# Patient Record
Sex: MALE | Race: WHITE | HISPANIC OR LATINO | Employment: FULL TIME | URBAN - METROPOLITAN AREA
[De-identification: names, ages, dates, MRNs, and addresses within clinical notes are randomized per-mention and may not be internally consistent; named-entity substitution may affect disease eponyms.]

---

## 2017-10-27 ENCOUNTER — NON-PROVIDER VISIT (OUTPATIENT)
Dept: OCCUPATIONAL MEDICINE | Facility: CLINIC | Age: 57
End: 2017-10-27

## 2017-10-27 DIAGNOSIS — Z23 IMMUNIZATION DUE: ICD-10-CM

## 2017-10-27 PROCEDURE — 90746 HEPB VACCINE 3 DOSE ADULT IM: CPT | Performed by: PREVENTIVE MEDICINE

## 2017-11-14 ENCOUNTER — OFFICE VISIT (OUTPATIENT)
Dept: MEDICAL GROUP | Facility: MEDICAL CENTER | Age: 57
End: 2017-11-14
Payer: COMMERCIAL

## 2017-11-14 VITALS
WEIGHT: 205 LBS | TEMPERATURE: 97.7 F | OXYGEN SATURATION: 96 % | DIASTOLIC BLOOD PRESSURE: 98 MMHG | BODY MASS INDEX: 32.18 KG/M2 | SYSTOLIC BLOOD PRESSURE: 150 MMHG | HEART RATE: 57 BPM | HEIGHT: 67 IN | RESPIRATION RATE: 16 BRPM

## 2017-11-14 DIAGNOSIS — F41.8 DEPRESSION WITH ANXIETY: ICD-10-CM

## 2017-11-14 DIAGNOSIS — R10.11 RUQ ABDOMINAL PAIN: ICD-10-CM

## 2017-11-14 DIAGNOSIS — G56.03 BILATERAL CARPAL TUNNEL SYNDROME: ICD-10-CM

## 2017-11-14 DIAGNOSIS — E66.9 OBESITY (BMI 30-39.9): ICD-10-CM

## 2017-11-14 DIAGNOSIS — L91.8 MULTIPLE ACQUIRED SKIN TAGS: ICD-10-CM

## 2017-11-14 DIAGNOSIS — Z23 NEED FOR INFLUENZA VACCINATION: ICD-10-CM

## 2017-11-14 PROCEDURE — 99204 OFFICE O/P NEW MOD 45 MIN: CPT | Performed by: FAMILY MEDICINE

## 2017-11-14 ASSESSMENT — PATIENT HEALTH QUESTIONNAIRE - PHQ9: CLINICAL INTERPRETATION OF PHQ2 SCORE: 0

## 2017-11-14 NOTE — LETTER
[facility]  [address]  [place] [address]  Fax: [phone]   Authorization for Release/Disclosure of   Protected Health Information   Name: MIKA MCMAHON : 1960 SSN: [id number]   Address: [address] [place] [address] [address]  [place] [address] Phone:    [phone] (home)    I authorize the entity listed below to release/disclose the PHI below to:   [facility]   Provider or Entity Name:     Address   City, State, Zip   Phone:      Fax:     Reason for request: continuity of care   Information to be released:    [  ] LAST COLONOSCOPY,  including any PATH REPORT and follow-up  [  ] LAST FIT/COLOGUARD RESULT [  ] LAST DEXA  [  ] LAST MAMMOGRAM  [  ] LAST PAP  [  ] LAST LABS [  ] RETINA EXAM REPORT  [  ] IMMUNIZATION RECORDS  [X  ] Release all info      [  ] Check here and initial the line next to each item to release ALL health information INCLUDING  _____ Care and treatment for drug and / or alcohol abuse  _____ HIV testing, infection status, or AIDS  _____ Genetic Testing    DATES OF SERVICE OR TIME PERIOD TO BE DISCLOSED: _____________  I understand and acknowledge that:  * This Authorization may be revoked at any time by you in writing, except if your health information has already been used or disclosed.  * Your health information that will be used or disclosed as a result of you signing this authorization could be re-disclosed by the recipient. If this occurs, your re-disclosed health information may no longer be protected by State or Federal laws.  * You may refuse to sign this Authorization. Your refusal will not affect your ability to obtain treatment.  * This Authorization becomes effective upon signing and will  on (date) __________.      If no date is indicated, this Authorization will  one (1) year from the signature date.    Name: Mika Mcmahon    Signature:   Date:     2017            PLEASE FAX REQUESTED RECORDS BACK TO: [phone]

## 2017-11-14 NOTE — ASSESSMENT & PLAN NOTE
Patient states that he used to be a heavier alcohol drinker, however stopped her count of approximately 24 years ago, was placed on antidepressant medication sometime after that, but stopped taking these medications approximately 8 years ago. Patient states that he continues to have mild anxiety and depression and a near constant basis. Patient doesn't have any particular triggers, states he has no great stressors between work, family life (lives with his son, has a good relationship with his ex-wife), and finances seem to be okay.    Of note, patient does work swing shift, typically from 4 PM to 12 AM Thursday to Monday, off on Tuesday and Wednesday.    Patient's last meal is typically at 8 PM, states that he will stay up for another few hours after coming home from work, typically watching TV or cell phone use.  Patient will then falsely sometime between 2 and 3 AM, will typically wake up by 7 or 8 AM. Therefore, patient estimates that he sleeps typically 5-6 times per night.    Patient states that room is fairly dark, is relatively comfortable, but that he shares room with his adult son who works the day shift.    Patient is not taking any illicit drugs, does not drink any alcohol, does not smoke cigarettes.    We discussed the possibility of therapy versus psychology referral in the future, but have decided upon using sleep hygiene as the first-line therapy at present time since this seems to be the most obvious inciting factor.    Of note, patient states that he can't feel centralized chest pain, attributes this to an anxiety attack, states that this more often happens when he has deficient amount of sleep, such as when he did only have 1-2 hours of sleep approximately 2 weeks ago.    Patient may have had a cardiac treadmill stress test by his former PCP. We will request records.    ROS is NEGATIVE for generalized weakness/fatigue, dizziness, vision/hearing changes, chest pain/pressure, palpitations, dyspnea,  tachypnea, intense feelings of dread, insomnia, decreased appetite, persistent nausea.

## 2017-11-14 NOTE — PROGRESS NOTES
Subjective:   Chief Complaint/History of Present Illness:  Mika Mcmahon is a 57 y.o. male patient new to [facility] who presents today to establish primary medical care and discuss medical conditions as listed below.  PMH, PSH, Social History, Medications, Allergies all reviewed as documented:    Depression with anxiety  Patient states that he used to be a heavier alcohol drinker, however stopped her count of approximately 24 years ago, was placed on antidepressant medication sometime after that, but stopped taking these medications approximately 8 years ago. Patient states that he continues to have mild anxiety and depression and a near constant basis. Patient doesn't have any particular triggers, states he has no great stressors between work, family life (lives with his son, has a good relationship with his ex-wife), and finances seem to be okay.    Of note, patient does work swing shift, typically from 4 PM to 12 AM Thursday to Monday, off on Tuesday and Wednesday.    Patient's last meal is typically at 8 PM, states that he will stay up for another few hours after coming home from work, typically watching TV or cell phone use.  Patient will then falsely sometime between 2 and 3 AM, will typically wake up by 7 or 8 AM. Therefore, patient estimates that he sleeps typically 5-6 times per night.    Patient states that room is fairly dark, is relatively comfortable, but that he shares room with his adult son who works the day shift.    Patient is not taking any illicit drugs, does not drink any alcohol, does not smoke cigarettes.    We discussed the possibility of therapy versus psychology referral in the future, but have decided upon using sleep hygiene as the first-line therapy at present time since this seems to be the most obvious inciting factor.    Of note, patient states that he can't feel centralized chest pain, attributes this to an anxiety attack, states that this more often happens when he has  deficient amount of sleep, such as when he did only have 1-2 hours of sleep approximately 2 weeks ago.    Patient may have had a cardiac treadmill stress test by his former PCP. We will request records.    ROS is NEGATIVE for generalized weakness/fatigue, dizziness, vision/hearing changes, chest pain/pressure, palpitations, dyspnea, tachypnea, intense feelings of dread, insomnia, decreased appetite, persistent nausea.    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 Patient currently works as a . Her [employer], and therefore has uses hands a lot. Patient does not recall being told to use carpal tunnel wrist splints nor being provided any stretches.    Patient states that this pain can awaken him at night with numbness in bilateral hands, does not have any bilateral  strength deficits secondary to numbness or paresthesias.    RUQ abdominal pain  Patient has right upper quadrant pain, states that this is typically provoked by eating fatty foods such as a cheeseburger. Patient and I reviewed his previous ultrasound in September 2016, which was suggestive of fatty liver disease. Given that patient has not drunk alcohol regularly in over 20 years, this is more likely be secondary to dietary patterns at present time.    Obesity (BMI 30-39.9)  Patient is mildly obese with a weight of 25 pounds at a height of 5 foot 7, with a BMI 32.1. Patient does not have regular cardiovascular exercise routines.    ROS is NEGATIVE for: cold or heat intolerance, anxiety/depression, chest pain/pressure, palpitations, hair thickening/coarsening/falling out/thinning, skin changes, diarrhea/constipation, unexpected weight change.    ROS is NEGATIVE for blurred vision, polydipsia, polyuria, diaphoresis, palpitations, fatigue,  irritability, flank pain, BLE paresthesias.    Multiple acquired skin tags  Patient with multiple skin tags, small one on the left neck superior to the clavicle, and a larger one in his left groin area that is larger.      Patient Active Problem List    Diagnosis Date Noted   • Bilateral carpal tunnel syndrome 11/14/2017   • Depression with anxiety 11/14/2017   • Obesity (BMI 30-39.9) 11/14/2017   • RUQ abdominal pain 11/14/2017   • Multiple acquired skin tags 11/14/2017       Additional History:   Allergies:    Review of patient's allergies indicates no known allergies.     Medications:     No current [facility]-ordered outpatient prescriptions on file.     No current [facility]-ordered facility-administered medications on file.         Past Medical History:     Past Medical History:   Diagnosis Date   • Psychiatric disorder     anxiety        Past Surgical History:   History reviewed. No pertinent surgical history.     Social History:     Social History   Substance Use Topics   • Smoking status: Former Smoker   • Smokeless tobacco: Never Used   • Alcohol use No        Family History:     No family status information on file.      History reviewed. No pertinent family history.    ROS:     - Constitutional: Negative for fever, chills, unexpected weight change, and fatigue/generalized weakness.     - Cardiovascular: Negative for chest pain, palpitations, orthopnea, and bilateral lower extremity edema.     - Gastrointestinal: Negative for heartburn, nausea, vomiting,  hematochezia, melena, diarrhea, constipation, and greasy/foul-smelling stools.     - Genitourinary: Negative for dysuria, polyuria, hematuria, pyuria, urinary urgency, and urinary incontinence.    - Musculoskeletal: Negative for myalgias, back pain, and joint pain.     - Skin: Negative for rash, itching, cyanotic skin color change.     - NOTE: All other systems reviewed and are negative, except as in HPI.     Objective:   Physical Exam:    Vitals: Blood pressure  "150/98, pulse (!) 57, temperature 36.5 °C (97.7 °F), resp. rate 16, height 1.702 m (5' 7\"), weight 93 kg (205 lb), SpO2 96 %.   BMI: Body mass index is 32.11 kg/m².   General/Constitutional: Vitals as above, Well nourished, well developed male in no acute distress   Head/Eyes:  - Head is grossly normal & atraumatic  - Bilateral conjunctivae clear and not injected, bilateral EOMI, bilateral PERRL   ENT:   - Bilateral external ears grossly normal in appearance, external auditory canals clear & bilateral TMs visualized with appropriate cone of light reflex, hearing grossly intact  - External nares normal in appearance and without discharge/bleeding, bilateral turbinates non-erythematous/non-edematous and without discharge/bleeding  - Good dentition & no palpable fluctuance of gums,  posterior oropharynx without erythema/edema/exudates  Neck: Neck supple, no masses, neck non-tender to palpation, no thyromegaly/goiter   Respiratory: No respiratory distress, bilateral lungs are clear to auscultation in all lung fields (anterior/lateral/posterior), no wheezing/rhonchi/rales   Cardiovascular: Regular rate and rhythm without murmur/gallops/rubs, distal pulses equal and 2+ bilaterally (radial, posterior tibial), no bilateral lower extremity edema   Gastrointestinal: Abdomen resonant to percussion, Bowel sounds present in all 4 quadrants, abdomen non-tender to light and deep palpation, ventral/umbilical hernias absent    MSK: Gait grossly normal & not antalgic, no tenderness to percussion of vertebral processes, no CVAT, no bilateral SI joint tenderness, no muscular atrophy of the thenar eminence or hand muscles of the first digit in bilateral hands   Integumentary: Small pedunculated hyper melanotic skin tag that is approximately 2 mm wide by 3-4 mm tall at the left neck in the area above his clavicle, 5-6 mm diameter by approximately one [facility] home pedunculated fleshy skin tag in left groin, otherwise no apparent " rashes   Neuro: Positive Tinel's and Phalen's sign in bilateral hands, Gross motor movement intact in all 4 extremities, Gross sensation intact to extremities and trunk, Gait grossly normal and not antalgic   Psych: Judgment grossly appropriate, no apparent depression/anxiety    Health Maintenance:      - I have requested previous records ([facility], [facility]), and will update accordingly.     Imaging/Labs:      - I have requested previous records ([facility], [facility]), and will update accordingly.     Assessment and Plan:   1. Depression with anxiety  Uncontrolled, May be secondary to alteration of sleep-wake cycles due to shift work. Patient to work on sleep hygiene, as documented in the patient instruction section below. We have discussed future possible plans including referral to psychology versus therapy.    2. Bilateral carpal tunnel syndrome  Uncontrolled, patient given conservative care instructions including stretching as source from sports medicine advisor, NSAID use (sees Profen 600 mg by mouth every 6 hours when necessary, as well as suggestion to use bilateral carpal tunnel wrist supports    3. RUQ abdominal pain  Uncontrolled, likely secondary to fatty liver disease as well as cholecystitis versus biliary sludge. Patient advised to eat a healthier diet, including more vegetables, whole grains, and lasts fat intake.    4. Obesity (BMI 30-39.9)  Uncontrolled.  Patient and I discussed the importance of lifestyle changes, with particular emphasis on plant-based nutrition (for the purposes of weight loss, general health, RUQ pain), as well as cardiovascular exercise, proper sleep, and stress management.  Patient verbalized understanding.   - Patient identified as having weight management issue.  Appropriate orders and counseling given.    5. Multiple acquired skin tags  Uncontrolled, we can use cryotherapy for the neck lesion, may be able to use it for the groin lesion as well.    6.  Need for influenza vaccination  Uncontrolled. However patient declines vaccination present time. We discussed good hand hygiene practices as well as general infection control practices that patient can perform to decrease risk of influenza infection.      RTC: In one month for depression/anxiety follow-up as well as potential cryotherapy for his skin tags, labs reviewed, and records review.    PLEASE NOTE: This dictation was created using voice recognition software. I have made every reasonable attempt to correct obvious errors, but I expect that there are errors of grammar and possibly content that I did not discover before finalizing the note.

## 2017-11-14 NOTE — ASSESSMENT & PLAN NOTE
Patient has right upper quadrant pain, states that this is typically provoked by eating fatty foods such as a cheeseburger. Patient and I reviewed his previous ultrasound in September 2016, which was suggestive of fatty liver disease. Given that patient has not drunk alcohol regularly in over 20 years, this is more likely be secondary to dietary patterns at present time.

## 2017-11-14 NOTE — ASSESSMENT & PLAN NOTE
Patient is mildly obese with a weight of 25 pounds at a height of 5 foot 7, with a BMI 32.1. Patient does not have regular cardiovascular exercise routines.    ROS is NEGATIVE for: cold or heat intolerance, anxiety/depression, chest pain/pressure, palpitations, hair thickening/coarsening/falling out/thinning, skin changes, diarrhea/constipation, unexpected weight change.    ROS is NEGATIVE for blurred vision, polydipsia, polyuria, diaphoresis, palpitations, fatigue, irritability, flank pain, BLE paresthesias.

## 2017-11-14 NOTE — PATIENT INSTRUCTIONS
"Síndrome del túnel carpiano   (Carpal Tunnel Syndrome)   El túnel carpiano es un espacio estrecho ubicado en el lado palmar de la jo. Está formado por los huesos de la jo y los ligamentos. Los nervios, vasos sanguíneos y tendones pasan a través del túnel carpiano. Los movimientos de la jo o ciertas enfermedades pueden causar hinchazón del túnel. Esta hinchazón comprime el nervio principal en la jo ((nervio mediano) y ocasiona un trastorno doloroso que se denomina síndrome del túnel carpiano.  CAUSAS   · Movimientos repetidos de la jo.  · Lesiones en la jo.  · Ciertas enfermedades hernesto la artritis, la diabetes, el alcoholismo, el hipertiroidismo o la insuficiencia renal.  · Obesidad.  · Embarazo.  SÍNTOMAS   · Sensación de \"pinchazos\" en los dedos o la mano.  · Hormigueo o entumecimiento en los dedos o en la mano.  · Sensación dolorosa en todo el brazo.  · Dolor en la jo que sube por el brazo hasta el hombro.  · Dolor que baja por la mano o los dedos.  · Sensación de debilidad en las tobi.  DIAGNÓSTICO   El médico le hará mayra historia clínica y un examen físico. Puede ser necesario hacer un electromiograma. Esta prueba mide las señales eléctricas enviadas por los músculos. Generalmente el paso de las señales eléctricas es impedido por el síndrome del túnel carpiano. También puede ser necesario que le tomen radiografías.   TRATAMIENTO   El síndrome del túnel carpiano puede curarse espontáneamente sin tratamiento. El médico le indicará el uso de un cabestrillo para la jo o que tome medicamentos hernesto antiinflamatorios no esteroides. Las inyecciones de cortisona pueden ayudar. A veces es necesaria la cirugía para liberar el nervio comprimido.   INSTRUCCIONES PARA EL CUIDADO EN EL HOGAR   · Volga todos los medicamentos según le indicó rogers médico. Sólo tome medicamentos de venta katelyn o recetados para calmar el dolor, las molestias o bajar la fiebre según las indicaciones de rogers médico.  · Si " le aconsejaron usar un cabestrillo para evitar que la jo se doble, úselo hernesto le indicaron. Es importante que use el cabestrillo patricia la noche. Úselo mientras sienta dolor o adormecimiento en la mano, el brazo o la jo. [place] puede durar entre 1 y 2 meses.  · Aditya reposar la jo de toda actividad que le cause dolor. Si wanda síntomas están relacionados con el trabajo, deberá conversar con rogers empleador acerca de la posibilidad de cambiar a mayra tarea que no requiera el uso de la jo.  · Aplique hielo en la jo después de los períodos prolongados de actividad.  ¨ Ponga el hielo en mayra bolsa plástica.  ¨ Colóquese mayra toalla entre la piel y la bolsa de hielo.  ¨ Deje el hielo en el lugar patricia 15 a 20 minutos, 3 a 4 veces por día.  · Cumpla con todas las visitas de control, según le indique rogers médico. Aquí se incluyen derivaciones a un ortopedista, fisioterapia y rehabilitación. Toda demora en obtener la asistencia necesaria puede heide hernesto resultado mayra demora o fracaso en la curación.  SOLICITE ATENCIÓN MÉDICA DE INMEDIATO SI:   · Desarrolla nuevos e inexplicables síntomas.  · Los síntomas actuales empeoran y la medicación no los controla.  ASEGÚRESE DE QUE:   · Comprende estas instrucciones.  · Controlará rogers enfermedad.  · Solicitará ayuda de inmediato si no mejora o si empeora.     Esta información no tiene hernesto fin reemplazar el consejo del médico. Asegúrese de hacerle al médico cualquier pregunta que tenga.     Document Released: 12/18/2006 Document Revised: 09/11/2013  Elsevier Interactive Patient Education ©2016 [employer]      Tips for Sleep:   A) Goal: Obtain a minimum of 7-8hours of continuous, uninterrupted, restful sleep per night.   B) Tips for Sleep Hygiene:   I) Go to bed and wake up at consistent times whether work/school day or not.    II) Keep room dark, quiet, and comfortable.  Increase exposure to sunlight during awake times and avoid bright lights (especially anything with a  backlight) at least the last 1-2hours before going to sleep.    III) Don't nap.    IV) Avoid stimulant or caffeine use more than 4 hours after wake time.

## 2017-11-14 NOTE — ASSESSMENT & PLAN NOTE
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 Patient currently works as a . Her [employer], and therefore has uses hands a lot. Patient does not recall being told to use carpal tunnel wrist splints nor being provided any stretches.    Patient states that this pain can awaken him at night with numbness in bilateral hands, does not have any bilateral  strength deficits secondary to numbness or paresthesias.

## 2017-11-14 NOTE — ASSESSMENT & PLAN NOTE
Patient with multiple skin tags, small one on the left neck superior to the clavicle, and a larger one in his left groin area that is larger.

## 2017-12-14 ENCOUNTER — OFFICE VISIT (OUTPATIENT)
Dept: MEDICAL GROUP | Facility: MEDICAL CENTER | Age: 57
End: 2017-12-14
Payer: COMMERCIAL

## 2017-12-14 ENCOUNTER — HOSPITAL ENCOUNTER (OUTPATIENT)
Dept: LAB | Facility: MEDICAL CENTER | Age: 57
End: 2017-12-14
Attending: FAMILY MEDICINE
Payer: COMMERCIAL

## 2017-12-14 VITALS
BODY MASS INDEX: 31.39 KG/M2 | HEIGHT: 67 IN | SYSTOLIC BLOOD PRESSURE: 124 MMHG | HEART RATE: 65 BPM | OXYGEN SATURATION: 96 % | TEMPERATURE: 98.9 F | DIASTOLIC BLOOD PRESSURE: 78 MMHG | WEIGHT: 200 LBS

## 2017-12-14 DIAGNOSIS — Z13.6 ENCOUNTER FOR LIPID SCREENING FOR CARDIOVASCULAR DISEASE: ICD-10-CM

## 2017-12-14 DIAGNOSIS — Z12.5 PROSTATE CANCER SCREENING: ICD-10-CM

## 2017-12-14 DIAGNOSIS — Z00.00 ROUTINE GENERAL MEDICAL EXAMINATION AT A HEALTH CARE FACILITY: ICD-10-CM

## 2017-12-14 DIAGNOSIS — F41.8 DEPRESSION WITH ANXIETY: ICD-10-CM

## 2017-12-14 DIAGNOSIS — G56.02 CARPAL TUNNEL SYNDROME OF LEFT WRIST: ICD-10-CM

## 2017-12-14 DIAGNOSIS — Z13.1 DIABETES MELLITUS SCREENING: ICD-10-CM

## 2017-12-14 DIAGNOSIS — Z13.220 ENCOUNTER FOR LIPID SCREENING FOR CARDIOVASCULAR DISEASE: ICD-10-CM

## 2017-12-14 DIAGNOSIS — K76.0 NON-ALCOHOLIC FATTY LIVER DISEASE: ICD-10-CM

## 2017-12-14 DIAGNOSIS — N52.01 ERECTILE DYSFUNCTION DUE TO ARTERIAL INSUFFICIENCY: ICD-10-CM

## 2017-12-14 DIAGNOSIS — L24.89 IRRITANT CONTACT DERMATITIS DUE TO OTHER AGENTS: ICD-10-CM

## 2017-12-14 LAB
ALBUMIN SERPL BCP-MCNC: 4.3 G/DL (ref 3.2–4.9)
ALBUMIN/GLOB SERPL: 1.2 G/DL
ALP SERPL-CCNC: 62 U/L (ref 30–99)
ALT SERPL-CCNC: 37 U/L (ref 2–50)
ANION GAP SERPL CALC-SCNC: 5 MMOL/L (ref 0–11.9)
AST SERPL-CCNC: 31 U/L (ref 12–45)
BILIRUB SERPL-MCNC: 1 MG/DL (ref 0.1–1.5)
BUN SERPL-MCNC: 12 MG/DL (ref 8–22)
CALCIUM SERPL-MCNC: 9.4 MG/DL (ref 8.5–10.5)
CHLORIDE SERPL-SCNC: 107 MMOL/L (ref 96–112)
CHOLEST SERPL-MCNC: 182 MG/DL (ref 100–199)
CO2 SERPL-SCNC: 27 MMOL/L (ref 20–33)
CREAT SERPL-MCNC: 0.85 MG/DL (ref 0.5–1.4)
EST. AVERAGE GLUCOSE BLD GHB EST-MCNC: 123 MG/DL
GFR SERPL CREATININE-BSD FRML MDRD: >60 ML/MIN/1.73 M 2
GLOBULIN SER CALC-MCNC: 3.7 G/DL (ref 1.9–3.5)
GLUCOSE SERPL-MCNC: 96 MG/DL (ref 65–99)
HBA1C MFR BLD: 5.9 % (ref 0–5.6)
HDLC SERPL-MCNC: 39 MG/DL
LDLC SERPL CALC-MCNC: 94 MG/DL
POTASSIUM SERPL-SCNC: 4.1 MMOL/L (ref 3.6–5.5)
PROT SERPL-MCNC: 8 G/DL (ref 6–8.2)
PSA SERPL-MCNC: 0.7 NG/ML (ref 0–4)
SODIUM SERPL-SCNC: 139 MMOL/L (ref 135–145)
TRIGL SERPL-MCNC: 243 MG/DL (ref 0–149)

## 2017-12-14 PROCEDURE — 99214 OFFICE O/P EST MOD 30 MIN: CPT | Performed by: FAMILY MEDICINE

## 2017-12-14 PROCEDURE — 80061 LIPID PANEL: CPT

## 2017-12-14 PROCEDURE — 80053 COMPREHEN METABOLIC PANEL: CPT

## 2017-12-14 PROCEDURE — 84153 ASSAY OF PSA TOTAL: CPT

## 2017-12-14 PROCEDURE — 83036 HEMOGLOBIN GLYCOSYLATED A1C: CPT

## 2017-12-14 PROCEDURE — 36415 COLL VENOUS BLD VENIPUNCTURE: CPT

## 2017-12-14 RX ORDER — METHYLPREDNISOLONE 4 MG/1
TABLET ORAL
Qty: 21 TAB | Refills: 0 | Status: SHIPPED | OUTPATIENT
Start: 2017-12-14 | End: 2017-12-20

## 2017-12-14 NOTE — ASSESSMENT & PLAN NOTE
Patient has hives in his left posterior axillary agent of skin extending superiorly to the superior of the lateral border of his scapula. Patient states that he was exposed to some form of irritant chemical approximately 1-2 months ago, thinks that this may have started since that time, has not gotten better using over-the-counter creams.    Patient says it is mildly pruritic, improved with cold showers, worsened with warm showers, also somewhat improved with scratching.

## 2017-12-14 NOTE — ASSESSMENT & PLAN NOTE
Patient was using his bilateral carpal tunnel wrist supports on a regular basis, however he has had such great improvement that she has stopped using them on a daily basis. Patient states that he has some mild residual numbness/paresthesias of the thenar eminence of the left hand only.    Patient continues to do stretches, and when necessary usage of NSAIDs.

## 2017-12-14 NOTE — ASSESSMENT & PLAN NOTE
"Patient states that he feels better, overall.  He continues to work swing shift, typically from 4 PM to 12 AM Thursday to Monday, off on Tuesday and Wednesday.     Patient's last meal is typically at 8 PM, has stopped his TV watching and cell phone use.  Patient will then fall sometime between around 1AM, will typically wake up by 8 or 9 AM.     Patient has changed his diet to have less spicy food, has cut out coffee, has cut down on simple carbs, and states that he has less abdominal bloating, all of which are helping him have better sleep.     Patient states that room is fairly dark, is relatively comfortable, but that he shares room with his adult son who works the day shift.     Patient is not taking any illicit drugs, does not drink any alcohol, does not smoke cigarettes.     Of note, patient did have one episode of sharp chest pain, but attributes this to exercising more now.      Patient has stopped having panic attacks.    Patient did take a vacation to visit family in [place], states that this helped him to relax more, but did \"eat too much.\"    Patient declines pharmacotherapy at this time, states that he will continue to work on sleep and stress routines.     ROS is NEGATIVE for generalized weakness/fatigue, dizziness, vision/hearing changes, chest pain/pressure, palpitations, dyspnea, tachypnea, intense feelings of dread, insomnia, decreased appetite, persistent nausea.  "

## 2017-12-14 NOTE — ASSESSMENT & PLAN NOTE
Patient and I reviewed his records, that the ultrasound September 2016 demonstrated fatty liver disease. Therefore, as patient is not an alcohol drinker, this is nonalcoholic fatty liver disease due to overweight/obesity/metabolic syndrome    ROS is NEGATIVE for generalized weakness/fatigue, generalized pruritis, jaundice, RUQ pain.      Patient denies binge or regular&heavy alcohol drinking behaviors, denies IV drug use, denies recent sexual intercourse with new partners.

## 2017-12-14 NOTE — PROGRESS NOTES
"Subjective:   Chief Complaint/History of Present Illness:  Mika Mcmahon is a 57 y.o. male established patient who presents today to discuss the following medical conditions:    Depression with anxiety  Patient states that he feels better, overall.  He continues to work swing shift, typically from 4 PM to 12 AM Thursday to Monday, off on Tuesday and Wednesday.     Patient's last meal is typically at 8 PM, has stopped his TV watching and cell phone use.  Patient will then fall sometime between around 1AM, will typically wake up by 8 or 9 AM.     Patient has changed his diet to have less spicy food, has cut out coffee, has cut down on simple carbs, and states that he has less abdominal bloating, all of which are helping him have better sleep.     Patient states that room is fairly dark, is relatively comfortable, but that he shares room with his adult son who works the day shift.     Patient is not taking any illicit drugs, does not drink any alcohol, does not smoke cigarettes.     Of note, patient did have one episode of sharp chest pain, but attributes this to exercising more now.      Patient has stopped having panic attacks.    Patient did take a vacation to visit family in [place], states that this helped him to relax more, but did \"eat too much.\"    Patient declines pharmacotherapy at this time, states that he will continue to work on sleep and stress routines.     ROS is NEGATIVE for generalized weakness/fatigue, dizziness, vision/hearing changes, chest pain/pressure, palpitations, dyspnea, tachypnea, intense feelings of dread, insomnia, decreased appetite, persistent nausea.    Erectile dysfunction due to arterial insufficiency  Patient reports problems with erectile dysfunction over the last four months.  Patient states that while he does have morning tumescence, erections are not as hard as they used to be.  Additionally, patient states he has difficulty maintaining erections.      Patient denies known " history of coronary artery disease or ischemic cardiomyopathy, patient denies dyspnea on exertion and also cramping in BLE with ambulation.    ROS is NEGATIVE for vision/hearing changes, jaw pain/paresthesias, BUE pain/paresthesias/numbness/weakness, chest pain/pressure, palpitations, dyspnea, RUQ abdominal pain, oliguria/anuria, BLE edema.    Patient denies improper use of prescription medication, denies use of non-prescription and/or illicit drugs, denies use of supplements.    Irritant contact dermatitis due to other agents  Patient has hives in his left posterior axillary agent of skin extending superiorly to the superior of the lateral border of his scapula. Patient states that he was exposed to some form of irritant chemical approximately 1-2 months ago, thinks that this may have started since that time, has not gotten better using over-the-counter creams.    Patient says it is mildly pruritic, improved with cold showers, worsened with warm showers, also somewhat improved with scratching.    Carpal tunnel syndrome of left wrist  Patient was using his bilateral carpal tunnel wrist supports on a regular basis, however he has had such great improvement that she has stopped using them on a daily basis. Patient states that he has some mild residual numbness/paresthesias of the thenar eminence of the left hand only.    Patient continues to do stretches, and when necessary usage of NSAIDs.    Non-alcoholic fatty liver disease  Patient and I reviewed his records, that the ultrasound September 2016 demonstrated fatty liver disease. Therefore, as patient is not an alcohol drinker, this is nonalcoholic fatty liver disease due to overweight/obesity/metabolic syndrome    ROS is NEGATIVE for generalized weakness/fatigue, generalized pruritis, jaundice, RUQ pain.      Patient denies binge or regular&heavy alcohol drinking behaviors, denies IV drug use, denies recent sexual intercourse with new partners.      Patient Active  "Problem List    Diagnosis Date Noted   • Irritant contact dermatitis due to other agents 12/14/2017   • Erectile dysfunction due to arterial insufficiency 12/14/2017   • Carpal tunnel syndrome of left wrist 11/14/2017   • Depression with anxiety 11/14/2017   • Obesity (BMI 30-39.9) 11/14/2017   • Non-alcoholic fatty liver disease 11/14/2017   • Multiple acquired skin tags 11/14/2017       Additional History:   Allergies:    Patient has no known allergies.     Current Medications:     Current Outpatient Prescriptions   Medication Sig Dispense Refill   • MethylPREDNISolone (MEDROL DOSEPAK) 4 MG Tablet Therapy Pack As directed on the packaging label. 21 Tab 0     No current facility-administered medications for this visit.         Social History:     Social History   Substance Use Topics   • Smoking status: Former Smoker   • Smokeless tobacco: Never Used   • Alcohol use No       ROS:     - NOTE: All other systems reviewed and are negative, except as in HPI.     Objective:   Physical Exam:    Vitals: Blood pressure 124/78, pulse 65, temperature 37.2 °C (98.9 °F), height 1.702 m (5' 7\"), weight 90.7 kg (200 lb), SpO2 96 %.   BMI: Body mass index is 31.32 kg/m².   General/Constitutional: Vitals as above, Well nourished, well developed male in no acute distress   Head/Eyes: Head is grossly normal & atraumatic, bilateral conjunctivae clear and not injected, bilateral EOMI, bilateral PERRL   ENT: Bilateral external ears grossly normal in appearance, Hearing grossly intact, External nares normal in appearance and without discharge/bleeding   Respiratory: No respiratory distress, bilateral lungs are clear to ausculation in all lung fields (anterior/lateral/posterior), no wheezing/rhonchi/rales   Cardiovascular: Regular rate and rhythm without murmur/gallops/rubs, distal pulses are intact and equal bilaterally (radial, posterior tibial), no bilateral lower extremity edema   MSK: Gait grossly normal & not " antalgic   Integumentary: Diffuse large patches of erythematous wheals in left axillary/left shoulder region, otherwise No apparent rashes   Psych: Judgment grossly appropriate, no apparent depression/anxiety    Health Maintenance:     - Not addressed at this visit    Imaging/Labs:     - Not addressed at this visit    Assessment and Plan:   1. Depression with anxiety  Chronic, uncontrolled, improving.  Patient declines pharmacotherapy at present time, would like to proceed with further lifestyle changes, particularly to dietary habits, sleep hygiene, and cardio vascular exercise.    2. Erectile dysfunction due to arterial insufficiency  3. Prostate cancer screening  Acute, uncontrolled, evaluate for possible BPH versus prostate cancer with PSA.   - PROSTATE SPECIFIC AG SCREENING; Future    4. Irritant contact dermatitis due to other agents  Uncontrolled, allergic contact dermatitis 2/2    - MethylPREDNISolone (MEDROL DOSEPAK) 4 MG Tablet Therapy Pack; As directed on the packaging label.  Dispense: 21 Tab; Refill: 0    5. Carpal tunnel syndrome of left wrist  Chronic, uncontrolled, however improving. Patient to continue to use carpal tunnel wrist stabilizers as well as NSAIDs and stretching.    6. Non-alcoholic fatty liver disease  Chronic, uncontrolled. Patient's work on muscle changes to reduce weight, and this should decrease over time. We can investigate preliminarily with CMP as below.    7. Routine general medical examination at a health care facility  8. Diabetes mellitus screening  9. Encounter for lipid screening for cardiovascular disease  Unknown control of metabolic health. Labs as below to evaluate.   - HEMOGLOBIN A1C; Future   - COMP METABOLIC PANEL; Future   - LIPID PROFILE; Future    RTC: in 1month for labs review, erectile dysfunction management.    PLEASE NOTE: This dictation was created using voice recognition software. I have made every reasonable attempt to correct obvious errors, but I expect  that there are errors of grammar and possibly content that I did not discover before finalizing the note.

## 2017-12-14 NOTE — ASSESSMENT & PLAN NOTE
Patient reports problems with erectile dysfunction over the last four months.  Patient states that while he does have morning tumescence, erections are not as hard as they used to be.  Additionally, patient states he has difficulty maintaining erections.      Patient denies known history of coronary artery disease or ischemic cardiomyopathy, patient denies dyspnea on exertion and also cramping in BLE with ambulation.    ROS is NEGATIVE for vision/hearing changes, jaw pain/paresthesias, BUE pain/paresthesias/numbness/weakness, chest pain/pressure, palpitations, dyspnea, RUQ abdominal pain, oliguria/anuria, BLE edema.    Patient denies improper use of prescription medication, denies use of non-prescription and/or illicit drugs, denies use of supplements.

## 2017-12-15 PROBLEM — E78.2 MIXED DYSLIPIDEMIA: Status: ACTIVE | Noted: 2017-12-15

## 2017-12-18 ENCOUNTER — TELEPHONE (OUTPATIENT)
Dept: MEDICAL GROUP | Facility: MEDICAL CENTER | Age: 57
End: 2017-12-18

## 2017-12-18 NOTE — TELEPHONE ENCOUNTER
Phone Number Called: [phone] (home)     Message: Called and notified patient of results. Patient to follow-up in January with provider.    Left Message for patient to call back: no

## 2017-12-18 NOTE — TELEPHONE ENCOUNTER
----- Message from [place] Mcclelland M.D. sent at 12/15/2017  1:02 PM PST -----  MA to call patient to relate the following:     - Prediabetes, cholesterol levels are off (high triglycerides, low HDL [good cholesterol])     - Normal liver and kidney function     - Prostate levels normal       - If patient would like to discuss them in further detail, we can discuss this at clinic visit on 01/17/18@8:20am.

## 2017-12-23 ENCOUNTER — APPOINTMENT (OUTPATIENT)
Dept: RADIOLOGY | Facility: MEDICAL CENTER | Age: 57
End: 2017-12-23
Attending: EMERGENCY MEDICINE
Payer: COMMERCIAL

## 2017-12-23 ENCOUNTER — HOSPITAL ENCOUNTER (EMERGENCY)
Facility: MEDICAL CENTER | Age: 57
End: 2017-12-23
Attending: EMERGENCY MEDICINE
Payer: COMMERCIAL

## 2017-12-23 VITALS
BODY MASS INDEX: 31.49 KG/M2 | WEIGHT: 200.62 LBS | RESPIRATION RATE: 19 BRPM | OXYGEN SATURATION: 95 % | HEIGHT: 67 IN | HEART RATE: 65 BPM

## 2017-12-23 DIAGNOSIS — G47.00 INSOMNIA, UNSPECIFIED TYPE: ICD-10-CM

## 2017-12-23 DIAGNOSIS — F41.9 ANXIETY: ICD-10-CM

## 2017-12-23 DIAGNOSIS — R07.89 OTHER CHEST PAIN: ICD-10-CM

## 2017-12-23 DIAGNOSIS — F41.0 PANIC ATTACKS: ICD-10-CM

## 2017-12-23 DIAGNOSIS — R45.89 DEPRESSED MOOD: ICD-10-CM

## 2017-12-23 LAB
ANION GAP SERPL CALC-SCNC: 9 MMOL/L (ref 0–11.9)
BUN SERPL-MCNC: 16 MG/DL (ref 8–22)
CALCIUM SERPL-MCNC: 8.9 MG/DL (ref 8.4–10.2)
CHLORIDE SERPL-SCNC: 106 MMOL/L (ref 96–112)
CO2 SERPL-SCNC: 26 MMOL/L (ref 20–33)
CREAT SERPL-MCNC: 0.79 MG/DL (ref 0.5–1.4)
EKG IMPRESSION: NORMAL
GFR SERPL CREATININE-BSD FRML MDRD: >60 ML/MIN/1.73 M 2
GLUCOSE SERPL-MCNC: 99 MG/DL (ref 65–99)
POTASSIUM SERPL-SCNC: 3.5 MMOL/L (ref 3.6–5.5)
SODIUM SERPL-SCNC: 141 MMOL/L (ref 135–145)
TROPONIN I SERPL-MCNC: <0.02 NG/ML (ref 0–0.04)

## 2017-12-23 PROCEDURE — 71010 DX-CHEST-PORTABLE (1 VIEW): CPT

## 2017-12-23 PROCEDURE — 84484 ASSAY OF TROPONIN QUANT: CPT

## 2017-12-23 PROCEDURE — 99284 EMERGENCY DEPT VISIT MOD MDM: CPT

## 2017-12-23 PROCEDURE — 80048 BASIC METABOLIC PNL TOTAL CA: CPT

## 2017-12-23 PROCEDURE — 36415 COLL VENOUS BLD VENIPUNCTURE: CPT

## 2017-12-23 PROCEDURE — 93005 ELECTROCARDIOGRAM TRACING: CPT | Performed by: EMERGENCY MEDICINE

## 2017-12-23 RX ORDER — LORAZEPAM 1 MG/1
1 TABLET ORAL ONCE
Status: DISCONTINUED | OUTPATIENT
Start: 2017-12-23 | End: 2017-12-23 | Stop reason: HOSPADM

## 2017-12-23 ASSESSMENT — PAIN SCALES - GENERAL: PAINLEVEL_OUTOF10: 0

## 2017-12-23 NOTE — ED NOTES
Patient c/o no being able to sleep for the past 2 nights, because he feels anxious. Patient had chest pain and SOB 2 days ago that has gone away. Patient was treated for anxiety in past but stopped taking the medications 5 yrs ago because he didn't like how it made him feel.

## 2017-12-23 NOTE — ED NOTES
Patient provided printed discharge instructions which included signs and symptoms to look out for, why to return to ER, and other follow up appointments to make. Patient stated they had no further questions or concerns at this time. Patient discharged to home by self. Patient ambulated out of ER with stable gait.

## 2017-12-23 NOTE — ED PROVIDER NOTES
ED Provider Note    CHIEF COMPLAINT  Chief Complaint   Patient presents with   • Anxiety     feels like he is having a panic attack after having chest pain   • Unable to Sleep     last 2 nights     HPI    Primary care provider: [place] Mcclelland M.D.  Means of arrival: pov  History obtained from: patient  History limited by: nothing    Mika Mcmahon is a 57 y.o. male who presents with intermittent panic attacks, anxiety, mild depression, and insomnia for 2 nights. The patient has had many episodes of this in the past. His panic attacks have no focal trigger, last for a few seconds, associated with chest pain and dyspnea. He has not tried any medications for this in years. Saw his pcp last week and was offered antidepressants but 'didn't want to get addicted to those pills'. Denies any pain right now, and no interventions taken for his insomnia other than sleep hygiene measures (less screen time, fewer spicy/carb heavy foods). Currently pain free, no c/o aside from his mood being slightly sad at times, but he is not suicidal or homicidal and has no hallucinations. Lives with his son, has a girlfriend who is out of town, and is happy with his life.     REVIEW OF SYSTEMS  Constitutional: Negative for fever or chills.   HENT: Negative for nosebleeds or sore throat.    Eyes: Negative for vision changes or discharge.   Respiratory: Negative for cough but + occasional shortness of breath.    Cardiovascular: Positive for occasional chest pain and palpitations.   Gastrointestinal: Negative for nausea, vomiting, abdominal pain.   Genitourinary: Negative for dysuria or flank pain.   Musculoskeletal: Negative for back pain or joint pain.   Skin: Negative for itching or rash.   Neurological: Negative for sensory or motor changes.   Endo/Heme/Allergies: Negative for weight changes or hives.   Psychiatric/Behavioral: Positive for depression, panic attacks, but no suicidal ideas.       PAST MEDICAL HISTORY   has a past  "medical history of Psychiatric disorder.    PAST FAMILY HISTORY  No family history on file.    SOCIAL HISTORY  Social History     Social History Main Topics   • Smoking status: Former Smoker   • Smokeless tobacco: Never Used   • Alcohol use No   • Drug use: No   • Sexual activity: Not Currently     Partners: Female       SURGICAL HISTORY  patient denies any surgical history    CURRENT MEDICATIONS  Home Medications     Reviewed by Reena Leal R.N. (Registered Nurse) on 12/23/17 at 0457  Med List Status: <None>   Medication Last Dose Status        Patient Minor Taking any Medications                       ALLERGIES  No Known Allergies    PHYSICAL EXAM  VITAL SIGNS: Pulse 65   Resp 19   Ht 1.702 m (5' 7\")   Wt 91 kg (200 lb 9.9 oz)   SpO2 95%   BMI 31.42 kg/m²    Pulse ox interpretation: on room air, I interpret this pulse ox as normal.  Constitutional: Well developed, well nourished. No acute distress.  HEENT: Normocephalic, atraumatic. Posterior pharynx clear, mucous membranes moist.  Eyes:  EOMI. Normal sclera.  Neck: Supple, Full range of motion, nontender.  Chest/Pulmonary: Clear to ausculation bilaterally, no wheezes or rhonchi.  Cardiovascular: Regular rate and rhythm, no murmur.   Abdomen: Soft, nontender, no rebound, guarding, or masses.  Back: No CVA tenderness, nontender midline, no step offs.  Musculoskeletal: No deformity, no edema.  Neuro: Clear speech, normal coordination, cranial nerves II-XII grossly intact.  Psych: Flat but pleaseant mood and affect.  Skin: No rashes, warm and dry.      DIAGNOSTIC STUDIES / PROCEDURES    LABS & EKG  Results for orders placed or performed during the hospital encounter of 12/23/17   BASIC METABOLIC PANEL   Result Value Ref Range    Sodium 141 135 - 145 mmol/L    Potassium 3.5 (L) 3.6 - 5.5 mmol/L    Chloride 106 96 - 112 mmol/L    Co2 26 20 - 33 mmol/L    Glucose 99 65 - 99 mg/dL    Bun 16 8 - 22 mg/dL    Creatinine 0.79 0.50 - 1.40 mg/dL    Calcium 8.9 8.4 " - 10.2 mg/dL    Anion Gap 9.0 0.0 - 11.9   TROPONIN   Result Value Ref Range    Troponin I <0.02 0.00 - 0.04 ng/mL   ESTIMATED GFR   Result Value Ref Range    GFR If African American >60 >60 mL/min/1.73 m 2    GFR If Non African American >60 >60 mL/min/1.73 m 2   EKG (ER)   Result Value Ref Range    Report       [facility]    Test Date:  2017  Pt Name:    RM RAMOS                Department: [facility]  MRN:        [id number]                      [facility]  Gender:     Male                         Technician: [id number]  :        1960                   Requested By:MAULIK MAO  Order #:    [id number]                    Reading MD: Maulik Mao MD    Measurements  Intervals                                Axis  Rate:       61                           P:          41  DE:         227                          QRS:        26  QRSD:       89                           T:          91  QT:         420  QTc:        423    Interpretive Statements  Sinus rhythm  Prolonged DE interval  no stemi or strain  No previous ECG available for comparison    Electronically Signed On 2017 14:37:47 PST by Maulik Mao MD           RADIOLOGY  DX-CHEST-PORTABLE (1 VIEW)   Final Result      1.  No acute cardiopulmonary disease.   2.  Nodule versus artifact at the LEFT lung base.          COURSE & MEDICAL DECISION MAKING    This is a 57 y.o. male who presents with depressed mood, insomnia, intermittent cp/palpitations he feels are panic attacks.     Differential Diagnosis includes but is not limited to:  Dysrhythmia, acs, anxiety disorder, depression, panic attacks, lyte abnormality    ED Course:  Plan ekg, labs, observation, cxr.   CXR nothing acute, ? Nodule L lung base, d/w patient incidental finding and need for 3mo f/u cxr. Understood.  EKG NSR no stemi or strain. DE slightly long, no priors to compare.   Feeling better w/o intervention other than counseling and  reassurance. Trop neg doubt acs. Heart score 1 for age. Pt is feeling much better and comfortable going home. I recommend he try melatonin otc for sleep, and immediate return if he gets any worse. Will f/u with pcp as soon as possible to trial antidepressants which I feel he would greatly benefit from. The patient is sober, appropriate, and has decision-making capacity. He is not  A threat to himself or others and doesn't meet involuntary hold criteria, so I feel he is safe for dc home.     Medications - No data to display    FINAL IMPRESSION  1. Depressed mood    2. Insomnia, unspecified type    3. Anxiety    4. Panic attacks    5. Other chest pain        PRESCRIPTIONS  There are no discharge medications for this patient.      FOLLOW UP  South Mcclelland M.D.  [address]  [address]  [facility] [id number] [phone]    Schedule an appointment as soon as possible for a visit in 1 day      [facility], Emergency Dept  [address]  [facility] [address] [phone]  Today  If symptoms worsen        -DISCHARGE-       Results, exam findings, clinical impression, presumed diagnosis, treatment options, and strict return precautions were discussed with the patient, and they verbalized understanding, agreed with, and appreciated the plan of care.    Pertinent Labs & Imaging studies reviewed and verified by myself, as well as nursing notes and the patient's past medical, family, and social histories (See chart for details).    The patient is referred to a primary physician for blood pressure management, diabetic screening, and for all other preventative health concerns.     Portions of this record were made with voice recognition software.  Despite my review, spelling/grammar/context errors may still remain.  Interpretation of this chart should be taken in this context.    Electronically signed by Maulik Masters on 12/23/2017 at 2:42 PM.

## 2017-12-23 NOTE — DISCHARGE INSTRUCTIONS
You were seen and evaluated in the Emergency Department at [facility] for:     Depressed mood, panic attacks, inability to sleep.    You had the following tests and studies:    EKG, blood tests, chest x-ray are all reassuring. Your chest x-ray had a small spot on it that may be a nodule or nothing. This needs to be checked by your primary doctor in 3-6 months to make sure it's nothing scary like cancer.     We recommend that you follow up with your primary care doctor as soon as possible as we think he would benefit from taking an antidepressant. This can often help with anxiety taken every day, antidepressants of a very low risk of addiction or serious side effects and it might be a near benefit to try starting one of these medications with her primary care doctor. You might also benefit from eating with a therapist or counselor.   You can try melatonin, a supplement, 1-3 mg before going to bed to try to help your sleep.  ----------------------------    Please make sure to follow up with:    Your primary care doctor as soon as possible for recheck, if he mood gets any worse history of thoughts of hurting herself please return to the ER immediately.  ----------------------------    We always encourage patients to return IMMEDIATELY if they have:  Increased or changing pain, passing out, fevers over 100.4 (taken in your mouth or rectally) for more than 2 days, redness or swelling of skin or tissues, feeling like your heart is beating fast, chest pain that is new or worsening, trouble breathing, feeling like your throat is closing up and can not breath, inability to walk, weakness of any part of your body, new dizziness, severe bleeding that won't stop from any part of your body, if you can't eat or drink, or if you have any other concerns.   If you feel worse, please know that you can always return with any questions, concerns, worse symptoms, or you are feeling unsafe. We certainly cannot say for sure  that we have ruled out every illness or dangerous disease, but we feel that at this specific time, your exam, tests, and vital signs like heart rate and blood pressure are safe for discharge.         Depression, Adult  Depression is feeling sad, low, down in the dumps, blue, gloomy, or empty. In general, there are two kinds of depression:  · Normal sadness or grief. This can happen after something upsetting. It often goes away on its own within 2 weeks. After losing a loved one (bereavement), normal sadness and grief may last longer than two weeks. It usually gets better with time.  · Clinical depression. This kind lasts longer than normal sadness or grief. It keeps you from doing the things you normally do in life. It is often hard to function at home, work, or at school. It may affect your relationships with others. Treatment is often needed.  GET HELP RIGHT AWAY IF:  · You have thoughts about hurting yourself or others.  · You lose touch with reality (psychotic symptoms). You may:  ¨ See or hear things that are not real.  ¨ Have untrue beliefs about your life or people around you.  · Your medicine is giving you problems.  MAKE SURE YOU:  · Understand these instructions.  · Will watch your condition.  · Will get help right away if you are not doing well or get worse.     This information is not intended to replace advice given to you by your health care provider. Make sure you discuss any questions you have with your health care provider.     Document Released: 01/20/2012 Document Revised: 01/08/2016 Document Reviewed: 04/18/2013  [employer] Patient Education ©2016 [employer]    Panic Attacks  Panic attacks are sudden, short-lived surges of severe anxiety, fear, or discomfort. They may occur for no reason when you are relaxed, when you are anxious, or when you are sleeping. Panic attacks may occur for a number of reasons:   · Healthy people occasionally have panic attacks in extreme, life-threatening  situations, such as war or natural disasters. Normal anxiety is a protective mechanism of the body that helps us react to danger (fight or flight response).  · Panic attacks are often seen with anxiety disorders, such as panic disorder, social anxiety disorder, generalized anxiety disorder, and phobias. Anxiety disorders cause excessive or uncontrollable anxiety. They may interfere with your relationships or other life activities.  · Panic attacks are sometimes seen with other mental illnesses, such as depression and posttraumatic stress disorder.  · Certain medical conditions, prescription medicines, and drugs of abuse can cause panic attacks.  SYMPTOMS   Panic attacks start suddenly, peak within 20 minutes, and are accompanied by four or more of the following symptoms:  · Pounding heart or fast heart rate (palpitations).  · Sweating.  · Trembling or shaking.  · Shortness of breath or feeling smothered.  · Feeling choked.  · Chest pain or discomfort.  · Nausea or strange feeling in your stomach.  · Dizziness, light-headedness, or feeling like you will faint.  · Chills or hot flushes.  · Numbness or tingling in your lips or hands and feet.  · Feeling that things are not real or feeling that you are not yourself.  · Fear of losing control or going crazy.  · Fear of dying.  Some of these symptoms can mimic serious medical conditions. For example, you may think you are having a heart attack. Although panic attacks can be very scary, they are not life threatening.  DIAGNOSIS   Panic attacks are diagnosed through an assessment by your health care provider. Your health care provider will ask questions about your symptoms, such as where and when they occurred. Your health care provider will also ask about your medical history and use of alcohol and drugs, including prescription medicines. Your health care provider may order blood tests or other studies to rule out a serious medical condition. Your health care provider may  refer you to a mental health professional for further evaluation.  TREATMENT   · Most healthy people who have one or two panic attacks in an extreme, life-threatening situation will not require treatment.  · The treatment for panic attacks associated with anxiety disorders or other mental illness typically involves counseling with a mental health professional, medicine, or a combination of both. Your health care provider will help determine what treatment is best for you.  · Panic attacks due to physical illness usually go away with treatment of the illness. If prescription medicine is causing panic attacks, talk with your health care provider about stopping the medicine, decreasing the dose, or substituting another medicine.  · Panic attacks due to alcohol or drug abuse go away with abstinence. Some adults need professional help in order to stop drinking or using drugs.  HOME CARE INSTRUCTIONS   · Take all medicines as directed by your health care provider.    · Schedule and attend follow-up visits as directed by your health care provider. It is important to keep all your appointments.  SEEK MEDICAL CARE IF:  · You are not able to take your medicines as prescribed.  · Your symptoms do not improve or get worse.  SEEK IMMEDIATE MEDICAL CARE IF:   · You experience panic attack symptoms that are different than your usual symptoms.  · You have serious thoughts about hurting yourself or others.  · You are taking medicine for panic attacks and have a serious side effect.  MAKE SURE YOU:  · Understand these instructions.  · Will watch your condition.  · Will get help right away if you are not doing well or get worse.     This information is not intended to replace advice given to you by your health care provider. Make sure you discuss any questions you have with your health care provider.     Document Released: 12/18/2006 Document Revised: 12/23/2014 Document Reviewed: 08/01/2014  [employer] Patient Education  ©2016 [employer]        Nonspecific Chest Pain  It is often hard to find the cause of chest pain. There is always a chance that your pain could be related to something serious, such as a heart attack or a blood clot in your lungs. Chest pain can also be caused by conditions that are not life-threatening. If you have chest pain, it is very important to follow up with your doctor.    HOME CARE  · If you were prescribed an antibiotic medicine, finish it all even if you start to feel better.  · Avoid any activities that cause chest pain.  · Do not use any tobacco products, including cigarettes, chewing tobacco, or electronic cigarettes. If you need help quitting, ask your doctor.  · Do not drink alcohol.  · Take medicines only as told by your doctor.  · Keep all follow-up visits as told by your doctor. This is important. This includes any further testing if your chest pain does not go away.  · Your doctor may tell you to keep your head raised (elevated) while you sleep.  · Make lifestyle changes as told by your doctor. These may include:  ¨ Getting regular exercise. Ask your doctor to suggest some activities that are safe for you.  ¨ Eating a heart-healthy diet. Your doctor or a diet specialist (dietitian) can help you to learn healthy eating options.  ¨ Maintaining a healthy weight.  ¨ Managing diabetes, if necessary.  ¨ Reducing stress.  GET HELP IF:  · Your chest pain does not go away, even after treatment.  · You have a rash with blisters on your chest.  · You have a fever.  GET HELP RIGHT AWAY IF:  · Your chest pain is worse.  · You have an increasing cough, or you cough up blood.  · You have severe belly (abdominal) pain.  · You feel extremely weak.  · You pass out (faint).  · You have chills.  · You have sudden, unexplained chest discomfort.  · You have sudden, unexplained discomfort in your arms, back, neck, or jaw.  · You have shortness of breath at any time.  · You suddenly start to sweat, or your skin gets  clammy.  · You feel nauseous.  · You vomit.  · You suddenly feel light-headed or dizzy.  · Your heart begins to beat quickly, or it feels like it is skipping beats.  These symptoms may be an emergency. Do not wait to see if the symptoms will go away. Get medical help right away. Call your local emergency services (911 in the [place] Do not drive yourself to the hospital.     This information is not intended to replace advice given to you by your health care provider. Make sure you discuss any questions you have with your health care provider.     Document Released: 06/05/2009 Document Revised: 01/08/2016 Document Reviewed: 07/24/2015  [employer] Patient Education ©2016 [employer]    Insomnia  Insomnia means you have trouble falling or staying asleep. It affects about one person in three at different times and is usually related to stress from work, school, or personal relations. Insomnia is also a sign of depression or anxiety. Other medical problems that cause insomnia include conditions that cause pain, night leg cramps, coughing, shortness of breath, urinary problems, and fevers. Sleep apnea is an abnormal breathing pattern at night that can cause insomnia and loud snoring. Certain medications and excess intake of caffeine drinks (coffee, tea, weston) can also interfere with normal sleep.  Treatment for insomnia depends on the cause. Besides specific medical treatment, the following measures can help you relax and get better sleep. Get regular exercise every day, at least several hours before bed time. Try to get to bed at the same time every night. Take a hot bath before retiring to help you relax. Do not stay in bed if you are unable to sleep. During the daytime avoid staying in bed to watch television, eat, or read. Reduce unwanted noise and light in your room. Keep your room at a comfortable temperature.  Avoid alcohol as it causes one to sleep less soundly, may cause you to awaken during the  night, and can leave you feeling groggy the next day. Using a mild sedative prescribed or suggested by your caregiver may be needed, but the daily use of sleeping pills is not recommended. Anti-depressant medicines can improve sleep in people with depression. Please call your doctor for follow up care to better understand the cause and proper treatment of your insomnia.  Document Released: 01/25/2006 Document Revised: 03/11/2013 Document Reviewed: 12/18/2006  [employer]® Patient Information ©2014 [employer], [employer].

## 2017-12-27 ENCOUNTER — OFFICE VISIT (OUTPATIENT)
Dept: MEDICAL GROUP | Facility: MEDICAL CENTER | Age: 57
End: 2017-12-27
Payer: COMMERCIAL

## 2017-12-27 VITALS
WEIGHT: 195 LBS | SYSTOLIC BLOOD PRESSURE: 116 MMHG | DIASTOLIC BLOOD PRESSURE: 74 MMHG | OXYGEN SATURATION: 96 % | TEMPERATURE: 98.1 F | HEART RATE: 63 BPM | HEIGHT: 67 IN | BODY MASS INDEX: 30.61 KG/M2

## 2017-12-27 DIAGNOSIS — R73.03 PREDIABETES: ICD-10-CM

## 2017-12-27 DIAGNOSIS — N52.01 ERECTILE DYSFUNCTION DUE TO ARTERIAL INSUFFICIENCY: ICD-10-CM

## 2017-12-27 DIAGNOSIS — F41.8 DEPRESSION WITH ANXIETY: ICD-10-CM

## 2017-12-27 DIAGNOSIS — E78.2 MIXED DYSLIPIDEMIA: ICD-10-CM

## 2017-12-27 PROBLEM — K76.0 NON-ALCOHOLIC FATTY LIVER DISEASE: Status: RESOLVED | Noted: 2017-11-14 | Resolved: 2017-12-27

## 2017-12-27 PROCEDURE — 99214 OFFICE O/P EST MOD 30 MIN: CPT | Performed by: FAMILY MEDICINE

## 2017-12-27 RX ORDER — ALPRAZOLAM 0.5 MG/1
0.5 TABLET ORAL
Qty: 30 TAB | Refills: 0 | Status: SHIPPED | OUTPATIENT
Start: 2017-12-27 | End: 2018-01-26

## 2017-12-28 NOTE — PROGRESS NOTES
Subjective:   Chief Complaint/History of Present Illness:  iMka Mcmahon is a 57 y.o. male established patient who presents today to discuss the following medical conditions:    Depression with anxiety  Patient states he recently presented to her now ER for concerns regarding anxiety attack. Patient was cleared by ER physician from having acute coronary syndrome. Therefore, patient was advised to follow up with PCP, me, in order to initiate therapy for stress/anxiety control.    Patient cannot identify any particular triggers for this anxiety attack. Patient and I discussed pharmacotherapy, whether to use when necessary benzodiazepine versus long-term anxiety medication such as necessary. At present time, patient does not feel that this will be a regular occurrence, and therefore would like to opt for when necessary usage of benzodiazepine.    Patient has not a regular heavy alcohol drinker, nor is he a binge alcohol drinker, and patient does not have any known renal or hepatic diseases. Recent CMP was within normal limits.    Patient does not use any illicit drugs, is not drinking excessive amounts of caffeine.    As with notes from previous date of service 12/14/2017 regarding depression and anxiety patient states that, generally speaking, his levels of stress are improving and his sleep is also improving.    ROS negative for suicidal or homicidal ideation, also negative for auditory or visual hallucinations.    Erectile dysfunction due to arterial insufficiency  Symptoms are relatively unchanged from last set of service 12/14/2017) erectile dysfunction. We reviewed recent labs, the PSA is within normal limits. A additional labs of testosterone, to rule out that hypogonadism is in effect    Mixed dyslipidemia  Patient and I discussed recent labs (see below) and that ASCVD risk is increased based on most recent lipid panel, current blood pressure (non-hypertensive), diabetes status (prediabetic), and  smoking status (non-smoker).    Patient and I then discussed necessary dietary changes to make to address dyslipidemia.  Patient verbalized understanding.    ROS is NEGATIVE for dizziness, generalized weakness/fatigue, vision/hearing changes, jaw pain/paresthesias, BUE pain/paresthesias/numbness/weakness, chest pain/pressure, palpitations, dyspnea, RUQ abdominal pain, oliguria/anuria, BLE edema.    Lab Results   Component Value Date/Time    CHOLSTRLTOT 182 12/14/2017 09:43 AM    LDL 94 12/14/2017 09:43 AM    HDL 39 (A) 12/14/2017 09:43 AM    TRIGLYCERIDE 243 (H) 12/14/2017 09:43 AM       Prediabetes  We discussed that patient is diagnosed with prediabetes, uncontrolled, given that the A1c is:   Lab Results   Component Value Date/Time    HBA1C 5.9 (H) 12/14/2017 09:43 AM        We discussed that prediabetes/diabetes is a medical condition of lifestyle habits (less than optimal dietary choices, insufficient cardiovascular exercise). Furthermore, we discussed that at present time it is better to pursue lifestyle changes rather than starting medications. Patient is in agreement. Please see review of systems as below.    ROS is NEGATIVE for blurred vision, polydipsia, polyuria, diaphoresis, palpitations, fatigue, irritability, flank pain, BLE paresthesias.      Patient Active Problem List    Diagnosis Date Noted   • Prediabetes 12/27/2017   • Mixed dyslipidemia 12/15/2017   • Irritant contact dermatitis due to other agents 12/14/2017   • Erectile dysfunction due to arterial insufficiency 12/14/2017   • Carpal tunnel syndrome of left wrist 11/14/2017   • Depression with anxiety 11/14/2017   • Obesity (BMI 30-39.9) 11/14/2017   • Multiple acquired skin tags 11/14/2017       Additional History:   Allergies:    Patient has no known allergies.     Current Medications:     Current Outpatient Prescriptions   Medication Sig Dispense Refill   • alprazolam (XANAX) 0.5 MG Tab Take 1 Tab by mouth 1 time daily as needed for Anxiety  "for up to 30 days. 30 Tab 0     No current facility-administered medications for this visit.         Social History:     Social History   Substance Use Topics   • Smoking status: Former Smoker   • Smokeless tobacco: Never Used   • Alcohol use No       ROS:     - NOTE: All other systems reviewed and are negative, except as in HPI.     Objective:   Physical Exam:    Vitals: Blood pressure 116/74, pulse 63, temperature 36.7 °C (98.1 °F), height 1.702 m (5' 7\"), weight 88.5 kg (195 lb), SpO2 96 %.   BMI: Body mass index is 30.54 kg/m².   General/Constitutional: Vitals as above, Well nourished, well developed male in no acute distress   Head/Eyes: Head is grossly normal & atraumatic, bilateral conjunctivae clear and not injected, bilateral EOMI, bilateral PERRL   ENT: Bilateral external ears grossly normal in appearance, Hearing grossly intact, External nares normal in appearance and without discharge/bleeding   Respiratory: No respiratory distress, bilateral lungs are clear to ausculation in all lung fields (anterior/lateral/posterior), no wheezing/rhonchi/rales   Cardiovascular: Regular rate and rhythm without murmur/gallops/rubs, distal pulses are intact and equal bilaterally (radial, posterior tibial), no bilateral lower extremity edema   MSK: Gait grossly normal & not antalgic   Integumentary: No apparent rashes   Psych: Judgment grossly appropriate, mild anxiety    Health Maintenance:     - Not addressed at this visit    Imaging/Labs:     - 12/14/17 -- Prediabetes, mixed dyslipidemia (high triglycerides, low HDL [good cholesterol]), Normal liver and kidney function, Prostate levels normal    Assessment and Plan:   1. Depression with anxiety  Acute on chronic, uncontrolled, we'll trial him on Xanax. Due to using controlled substance, patient and I discussed controlled substance treatment agreement, and the need for taking [facility] drug screen. Patient is in agreement with both.   - alprazolam (XANAX) 0.5 MG Tab; " Take 1 Tab by mouth 1 time daily as needed for Anxiety for up to 30 days.  Dispense: 30 Tab; Refill: 0   - [facility]; Future   - Controlled Substance Treatment Agreement    2. Erectile dysfunction due to arterial insufficiency  Chronic, uncontrolled, evaluated with testosterone. If this is within normal limits, we will proceed with phosphodiesterase inhibitors.   - TESTOSTERONE F&T MALE ADULT; Future    3. Mixed dyslipidemia  Chronic, uncontrolled, patient to work on lifestyle changes.    4. Prediabetes  Chronic, uncontrolled, patient to work on lifestyle changes.        RTC: in 2months for anxiety follow-up.    PLEASE NOTE: This dictation was created using voice recognition software. I have made every reasonable attempt to correct obvious errors, but I expect that there are errors of grammar and possibly content that I did not discover before finalizing the note.

## 2017-12-28 NOTE — ASSESSMENT & PLAN NOTE
Symptoms are relatively unchanged from last set of service 12/14/2017) erectile dysfunction. We reviewed recent labs, the PSA is within normal limits. A additional labs of testosterone, to rule out that hypogonadism is in effect

## 2017-12-28 NOTE — ASSESSMENT & PLAN NOTE
We discussed that patient is diagnosed with prediabetes, uncontrolled, given that the A1c is:   Lab Results   Component Value Date/Time    HBA1C 5.9 (H) 12/14/2017 09:43 AM        We discussed that prediabetes/diabetes is a medical condition of lifestyle habits (less than optimal dietary choices, insufficient cardiovascular exercise). Furthermore, we discussed that at present time it is better to pursue lifestyle changes rather than starting medications. Patient is in agreement. Please see review of systems as below.    ROS is NEGATIVE for blurred vision, polydipsia, polyuria, diaphoresis, palpitations, fatigue, irritability, flank pain, BLE paresthesias.

## 2017-12-28 NOTE — ASSESSMENT & PLAN NOTE
Patient states he recently presented to her now ER for concerns regarding anxiety attack. Patient was cleared by ER physician from having acute coronary syndrome. Therefore, patient was advised to follow up with PCP, me, in order to initiate therapy for stress/anxiety control.    Patient cannot identify any particular triggers for this anxiety attack. Patient and I discussed pharmacotherapy, whether to use when necessary benzodiazepine versus long-term anxiety medication such as necessary. At present time, patient does not feel that this will be a regular occurrence, and therefore would like to opt for when necessary usage of benzodiazepine.    Patient has not a regular heavy alcohol drinker, nor is he a binge alcohol drinker, and patient does not have any known renal or hepatic diseases. Recent CMP was within normal limits.    Patient does not use any illicit drugs, is not drinking excessive amounts of caffeine.    As with notes from previous date of service 12/14/2017 regarding depression and anxiety patient states that, generally speaking, his levels of stress are improving and his sleep is also improving.    ROS negative for suicidal or homicidal ideation, also negative for auditory or visual hallucinations.

## 2017-12-28 NOTE — ASSESSMENT & PLAN NOTE
Patient and I discussed recent labs (see below) and that ASCVD risk is increased based on most recent lipid panel, current blood pressure (non-hypertensive), diabetes status (prediabetic), and smoking status (non-smoker).    Patient and I then discussed necessary dietary changes to make to address dyslipidemia.  Patient verbalized understanding.    ROS is NEGATIVE for dizziness, generalized weakness/fatigue, vision/hearing changes, jaw pain/paresthesias, BUE pain/paresthesias/numbness/weakness, chest pain/pressure, palpitations, dyspnea, RUQ abdominal pain, oliguria/anuria, BLE edema.    Lab Results   Component Value Date/Time    CHOLSTRLTOT 182 12/14/2017 09:43 AM    LDL 94 12/14/2017 09:43 AM    HDL 39 (A) 12/14/2017 09:43 AM    TRIGLYCERIDE 243 (H) 12/14/2017 09:43 AM

## 2018-01-03 ENCOUNTER — TELEPHONE (OUTPATIENT)
Dept: MEDICAL GROUP | Facility: MEDICAL CENTER | Age: 58
End: 2018-01-03

## 2018-01-03 DIAGNOSIS — F41.8 DEPRESSION WITH ANXIETY: ICD-10-CM

## 2018-01-03 NOTE — TELEPHONE ENCOUNTER
----- Message from [place] M.D. sent at 12/27/2017  4:37 PM PST -----  Cancel out his 01/2018 appointment

## 2018-02-27 ENCOUNTER — HOSPITAL ENCOUNTER (OUTPATIENT)
Dept: LAB | Facility: MEDICAL CENTER | Age: 58
End: 2018-02-27
Attending: FAMILY MEDICINE
Payer: COMMERCIAL

## 2018-02-27 ENCOUNTER — OFFICE VISIT (OUTPATIENT)
Dept: MEDICAL GROUP | Facility: MEDICAL CENTER | Age: 58
End: 2018-02-27
Payer: COMMERCIAL

## 2018-02-27 VITALS
SYSTOLIC BLOOD PRESSURE: 118 MMHG | OXYGEN SATURATION: 97 % | WEIGHT: 195 LBS | HEIGHT: 67 IN | DIASTOLIC BLOOD PRESSURE: 76 MMHG | TEMPERATURE: 97.9 F | HEART RATE: 63 BPM | BODY MASS INDEX: 30.61 KG/M2

## 2018-02-27 DIAGNOSIS — N52.01 ERECTILE DYSFUNCTION DUE TO ARTERIAL INSUFFICIENCY: ICD-10-CM

## 2018-02-27 DIAGNOSIS — G56.02 CARPAL TUNNEL SYNDROME OF LEFT WRIST: ICD-10-CM

## 2018-02-27 DIAGNOSIS — R73.03 PREDIABETES: ICD-10-CM

## 2018-02-27 DIAGNOSIS — F41.8 DEPRESSION WITH ANXIETY: ICD-10-CM

## 2018-02-27 DIAGNOSIS — E66.9 OBESITY (BMI 30-39.9): ICD-10-CM

## 2018-02-27 DIAGNOSIS — G56.22 ULNAR NEUROPATHY AT ELBOW OF LEFT UPPER EXTREMITY: ICD-10-CM

## 2018-02-27 DIAGNOSIS — Z12.11 COLON CANCER SCREENING: ICD-10-CM

## 2018-02-27 LAB
HBA1C MFR BLD: 5.5 % (ref ?–5.8)
INT CON NEG: NEGATIVE
INT CON POS: POSITIVE

## 2018-02-27 PROCEDURE — 99214 OFFICE O/P EST MOD 30 MIN: CPT | Performed by: FAMILY MEDICINE

## 2018-02-27 PROCEDURE — 84403 ASSAY OF TOTAL TESTOSTERONE: CPT

## 2018-02-27 PROCEDURE — 36415 COLL VENOUS BLD VENIPUNCTURE: CPT

## 2018-02-27 PROCEDURE — 83036 HEMOGLOBIN GLYCOSYLATED A1C: CPT | Performed by: FAMILY MEDICINE

## 2018-02-27 PROCEDURE — 84402 ASSAY OF FREE TESTOSTERONE: CPT

## 2018-02-27 PROCEDURE — 85025 COMPLETE CBC W/AUTO DIFF WBC: CPT

## 2018-02-27 PROCEDURE — 84270 ASSAY OF SEX HORMONE GLOBUL: CPT

## 2018-02-27 RX ORDER — IBUPROFEN 600 MG/1
600 TABLET ORAL EVERY 6 HOURS PRN
Qty: 30 TAB | Refills: 0 | COMMUNITY
Start: 2018-02-27

## 2018-02-27 RX ORDER — ALPRAZOLAM 0.25 MG/1
0.25 TABLET ORAL NIGHTLY PRN
COMMUNITY
End: 2019-03-22 | Stop reason: SDUPTHER

## 2018-02-27 RX ORDER — SILDENAFIL 100 MG/1
100 TABLET, FILM COATED ORAL PRN
Qty: 10 TAB | Refills: 3 | Status: SHIPPED | OUTPATIENT
Start: 2018-02-27 | End: 2018-03-27 | Stop reason: SDUPTHER

## 2018-02-27 NOTE — ASSESSMENT & PLAN NOTE
We discussed that patient is diagnosed with prediabetes, well-controlled, given that the A1c is:   Lab Results   Component Value Date/Time    HBA1C 5.5 02/27/2018 03:44 PM        We discussed that prediabetes/diabetes is a medical condition of lifestyle habits (less than optimal dietary choices, insufficient cardiovascular exercise). Furthermore, we discussed that at present time it is better to pursue lifestyle changes rather than starting medications. Patient is in agreement. Please see review of systems as below.    ROS is NEGATIVE for blurred vision, polydipsia, polyuria, diaphoresis, palpitations, fatigue, irritability, flank pain, BLE paresthesias.

## 2018-02-27 NOTE — ASSESSMENT & PLAN NOTE
Chronic, well controlled, patient will longer taking Xanax as of 2 weeks ago. Prior to that, he was taking Xanax, maybe once every 2 weeks.    ROS is NEGATIVE for generalized weakness/fatigue, dizziness, vision/hearing changes, chest pain/pressure, palpitations, dyspnea, tachypnea, intense feelings of dread, insomnia, decreased appetite, persistent nausea.

## 2018-02-28 LAB
BASOPHILS # BLD AUTO: 0.9 % (ref 0–1.8)
BASOPHILS # BLD: 0.06 K/UL (ref 0–0.12)
EOSINOPHIL # BLD AUTO: 0.06 K/UL (ref 0–0.51)
EOSINOPHIL NFR BLD: 0.9 % (ref 0–6.9)
ERYTHROCYTE [DISTWIDTH] IN BLOOD BY AUTOMATED COUNT: 40.2 FL (ref 35.9–50)
HCT VFR BLD AUTO: 44.5 % (ref 42–52)
HGB BLD-MCNC: 15.2 G/DL (ref 14–18)
IMM GRANULOCYTES # BLD AUTO: 0.01 K/UL (ref 0–0.11)
IMM GRANULOCYTES NFR BLD AUTO: 0.2 % (ref 0–0.9)
LYMPHOCYTES # BLD AUTO: 2.24 K/UL (ref 1–4.8)
LYMPHOCYTES NFR BLD: 34.1 % (ref 22–41)
MCH RBC QN AUTO: 28.5 PG (ref 27–33)
MCHC RBC AUTO-ENTMCNC: 34.2 G/DL (ref 33.7–35.3)
MCV RBC AUTO: 83.3 FL (ref 81.4–97.8)
MONOCYTES # BLD AUTO: 0.48 K/UL (ref 0–0.85)
MONOCYTES NFR BLD AUTO: 7.3 % (ref 0–13.4)
NEUTROPHILS # BLD AUTO: 3.71 K/UL (ref 1.82–7.42)
NEUTROPHILS NFR BLD: 56.6 % (ref 44–72)
NRBC # BLD AUTO: 0 K/UL
NRBC BLD-RTO: 0 /100 WBC
PLATELET # BLD AUTO: 298 K/UL (ref 164–446)
PMV BLD AUTO: 9.5 FL (ref 9–12.9)
RBC # BLD AUTO: 5.34 M/UL (ref 4.7–6.1)
SHBG SERPL-SCNC: 43 NMOL/L (ref 11–80)
TESTOST FREE MFR SERPL: 1.5 % (ref 1.6–2.9)
TESTOST FREE SERPL-MCNC: 35 PG/ML (ref 47–244)
TESTOST SERPL-MCNC: 234 NG/DL (ref 300–890)
WBC # BLD AUTO: 6.6 K/UL (ref 4.8–10.8)

## 2018-02-28 NOTE — PROGRESS NOTES
Subjective:   Chief Complaint/History of Present Illness:  Mika Mcmahon is a 57 y.o. male established patient who presents today to discuss management of prediabetes, depression, LUE paresthesias:    Prediabetes  We discussed that patient is diagnosed with prediabetes, well-controlled, given that the A1c is:   Lab Results   Component Value Date/Time    HBA1C 5.5 02/27/2018 03:44 PM        We discussed that prediabetes/diabetes is a medical condition of lifestyle habits (less than optimal dietary choices, insufficient cardiovascular exercise). Furthermore, we discussed that at present time it is better to pursue lifestyle changes rather than starting medications. Patient is in agreement. Please see review of systems as below.    ROS is NEGATIVE for blurred vision, polydipsia, polyuria, diaphoresis, palpitations, fatigue, irritability, flank pain, BLE paresthesias.    Depression with anxiety  Chronic, well controlled, patient will longer taking Xanax as of 2 weeks ago. Prior to that, he was taking Xanax, maybe once every 2 weeks.    ROS is NEGATIVE for generalized weakness/fatigue, dizziness, vision/hearing changes, chest pain/pressure, palpitations, dyspnea, tachypnea, intense feelings of dread, insomnia, decreased appetite, persistent nausea.    Obesity (BMI 30-39.9)  Chronic, uncontrolled, somewhat improving.     ROS is NEGATIVE for: cold or heat intolerance, anxiety/depression, chest pain/pressure, palpitations, hair thickening/coarsening/falling out/thinning, skin changes, diarrhea/constipation, unexpected weight change.    ROS is NEGATIVE for blurred vision, polydipsia, polyuria, diaphoresis, palpitations, fatigue, irritability, flank pain, BLE paresthesias.    Ulnar neuropathy at elbow of left upper extremity  Patient works as a  for [employer], states that he holds packages per items in his left arm in a crooked position (forearm supinated then flexed). Patient has intermittent  numbness/paresthesias into the medial aspect of his forearm and latter 2digits.  Patient also is experiencing left hand numbness that is worse when he is sleeping, can awaken him from sleep.    ROS is NEGATIVE for confusion, altered mentation, word finding difficulty, memory loss, diplopia, visual scotomas, facial droop, dysarthria, dysphagia, hemiplegia, gait instability, new/abnormal paresthesias/numbness.    Carpal tunnel syndrome of left wrist  Acute exacerbation of chronic condition. Please see notes from same date of service 2/20/2018 Re: Ulnar neuropathy    Erectile dysfunction due to arterial insufficiency  Chronic, uncontrolled, while PSA is WNL, patient didn't get testosterone labs, therefore this may still be d/t hypogonadism.  Patient would like to trial Sildenafil, doesn't have known cardiac or neurologic history.    ROS is NEGATIVE for dizziness, generalized weakness/fatigue, cold sweats, dizziness,  vision/hearing changes, jaw pain/paresthesias, BUE pain/paresthesias/numbness/weakness, chest pain/pressure, palpitations, dyspnea, nausea, RUQ abdominal pain, oliguria/anuria, BLE edema.     ROS is NEGATIVE for confusion, altered mentation, word finding difficulty, memory loss, diplopia, visual scotomas, facial droop, dysarthria, dysphagia, hemiplegia, gait instability, new/abnormal paresthesias/numbness.      Patient Active Problem List    Diagnosis Date Noted   • Ulnar neuropathy at elbow of left upper extremity 02/27/2018   • Prediabetes 12/27/2017   • Mixed dyslipidemia 12/15/2017   • Irritant contact dermatitis due to other agents 12/14/2017   • Erectile dysfunction due to arterial insufficiency 12/14/2017   • Carpal tunnel syndrome of left wrist 11/14/2017   • Depression with anxiety 11/14/2017   • Obesity (BMI 30-39.9) 11/14/2017   • Multiple acquired skin tags 11/14/2017       Additional History:   Allergies:    Patient has no known allergies.     Current Medications:     Current Outpatient  "Prescriptions   Medication Sig Dispense Refill   • ALPRAZolam (XANAX) 0.25 MG Tab Take 0.25 mg by mouth at bedtime as needed for Sleep.     • ibuprofen (MOTRIN) 600 MG Tab Take 1 Tab by mouth every 6 hours as needed. 30 Tab 0   • sildenafil citrate (VIAGRA) 100 MG tablet Take 1 Tab by mouth as needed for Erectile Dysfunction. 10 Tab 3     No current facility-administered medications for this visit.         Social History:     Social History   Substance Use Topics   • Smoking status: Former Smoker   • Smokeless tobacco: Never Used   • Alcohol use No       ROS:     - NOTE: All other systems reviewed and are negative, except as in HPI.     Objective:   Physical Exam:   Vitals: Blood pressure 118/76, pulse 63, temperature 36.6 °C (97.9 °F), height 1.702 m (5' 7\"), weight 88.5 kg (195 lb), SpO2 97 %.   BMI: Body mass index is 30.54 kg/m².   General/Constitutional: Vitals as above, Well nourished, well developed male in no acute distress   Head/Eyes: Head is grossly normal & atraumatic, bilateral conjunctivae clear and not injected, bilateral EOMI, bilateral PERRL   ENT: Bilateral external ears grossly normal in appearance, Hearing grossly intact, External nares normal in appearance and without discharge/bleeding   Respiratory: No respiratory distress, bilateral lungs are clear to ausculation in all lung fields (anterior/lateral/posterior), no wheezing/rhonchi/rales   Cardiovascular: Regular rate and rhythm without murmur/gallops/rubs, distal pulses are intact and equal bilaterally (radial, posterior tibial), no bilateral lower extremity edema   MSK: Gait grossly normal & not antalgic   Integumentary: No apparent rashes   Neuro: Positive Tinel's & paresthesias on tapping of cubital tunnel of LUE   Psych: Judgment grossly appropriate, no apparent depression/anxiety    Health Maintenance:     - Not addressed at this visit    Imaging/Labs:     - Point-of-care was here 4.5%    Assessment and Plan:   1. Prediabetes  Chronic, " Stable, well controlled. Continue dietary changes.   - POCT  A1C    2. Depression with anxiety  Chronic, stable, well-controlled.  Continue off medications    3. Obesity (BMI 30-39.9)  Chronic, uncontrolled.  Work on dietary changes + cardiovascular exercise.    4. Ulnar neuropathy at elbow of left upper extremity  Acute, new problem, uncontrolled, discussed PT referral, but patient would like to proceed with home PT.    5. Carpal tunnel syndrome of left wrist  Acute exacerbation of chronic condition.     - ibuprofen (MOTRIN) 600 MG Tab; Take 1 Tab by mouth every 6 hours as needed.  Dispense: 30 Tab; Refill: 0    6. Erectile dysfunction due to arterial insufficiency  Acute exacerbation of chronic condition.  Evaluate for hypogonadism.rial of sildenafil.    - CBC WITH DIFFERENTIAL; Future   - sildenafil citrate (VIAGRA) 100 MG tablet; Take 1 Tab by mouth as needed for Erectile Dysfunction.  Dispense: 10 Tab; Refill: 3    7. Colon cancer screening  Unknown control.     - REFERRAL TO GI FOR COLONOSCOPY        RTC: in 1 month for management of erectile dysfunction.    PLEASE NOTE: This dictation was created using voice recognition software. I have made every reasonable attempt to correct obvious errors, but I expect that there are errors of grammar and possibly content that I did not discover before finalizing the note.

## 2018-02-28 NOTE — ASSESSMENT & PLAN NOTE
Acute exacerbation of chronic condition. Please see notes from same date of service 2/20/2018 Re: Ulnar neuropathy

## 2018-02-28 NOTE — ASSESSMENT & PLAN NOTE
Chronic, uncontrolled, while PSA is WNL, patient didn't get testosterone labs, therefore this may still be d/t hypogonadism.  Patient would like to trial Sildenafil, doesn't have known cardiac or neurologic history.    ROS is NEGATIVE for dizziness, generalized weakness/fatigue, cold sweats, dizziness,  vision/hearing changes, jaw pain/paresthesias, BUE pain/paresthesias/numbness/weakness, chest pain/pressure, palpitations, dyspnea, nausea, RUQ abdominal pain, oliguria/anuria, BLE edema.     ROS is NEGATIVE for confusion, altered mentation, word finding difficulty, memory loss, diplopia, visual scotomas, facial droop, dysarthria, dysphagia, hemiplegia, gait instability, new/abnormal paresthesias/numbness.

## 2018-02-28 NOTE — ASSESSMENT & PLAN NOTE
Patient works as a  for [employer], states that he holds packages per items in his left arm in a crooked position (forearm supinated then flexed). Patient has intermittent numbness/paresthesias into the medial aspect of his forearm and latter 2digits.  Patient also is experiencing left hand numbness that is worse when he is sleeping, can awaken him from sleep.    ROS is NEGATIVE for confusion, altered mentation, word finding difficulty, memory loss, diplopia, visual scotomas, facial droop, dysarthria, dysphagia, hemiplegia, gait instability, new/abnormal paresthesias/numbness.

## 2018-03-27 ENCOUNTER — OFFICE VISIT (OUTPATIENT)
Dept: MEDICAL GROUP | Facility: MEDICAL CENTER | Age: 58
End: 2018-03-27
Payer: COMMERCIAL

## 2018-03-27 VITALS
HEIGHT: 67 IN | WEIGHT: 196 LBS | HEART RATE: 54 BPM | OXYGEN SATURATION: 97 % | BODY MASS INDEX: 30.76 KG/M2 | DIASTOLIC BLOOD PRESSURE: 76 MMHG | TEMPERATURE: 98.5 F | SYSTOLIC BLOOD PRESSURE: 124 MMHG

## 2018-03-27 DIAGNOSIS — N52.01 ERECTILE DYSFUNCTION DUE TO ARTERIAL INSUFFICIENCY: ICD-10-CM

## 2018-03-27 DIAGNOSIS — R73.03 PREDIABETES: ICD-10-CM

## 2018-03-27 DIAGNOSIS — E78.2 MIXED DYSLIPIDEMIA: ICD-10-CM

## 2018-03-27 DIAGNOSIS — R79.89 LOW TESTOSTERONE LEVEL IN MALE: ICD-10-CM

## 2018-03-27 PROCEDURE — 99214 OFFICE O/P EST MOD 30 MIN: CPT | Performed by: FAMILY MEDICINE

## 2018-03-27 RX ORDER — SILDENAFIL 100 MG/1
100 TABLET, FILM COATED ORAL
Qty: 10 TAB | Refills: 3 | Status: SHIPPED | OUTPATIENT
Start: 2018-03-27 | End: 2018-08-13

## 2018-03-27 RX ORDER — SILDENAFIL 100 MG/1
100 TABLET, FILM COATED ORAL PRN
Qty: 10 TAB | Refills: 3 | Status: SHIPPED | OUTPATIENT
Start: 2018-03-27 | End: 2018-03-27 | Stop reason: SDUPTHER

## 2018-03-27 RX ORDER — SILDENAFIL CITRATE 20 MG/1
20-100 TABLET ORAL
Qty: 30 TAB | Refills: 0 | Status: SHIPPED | OUTPATIENT
Start: 2018-03-27 | End: 2018-08-12 | Stop reason: SDUPTHER

## 2018-03-29 ENCOUNTER — NON-PROVIDER VISIT (OUTPATIENT)
Dept: OCCUPATIONAL MEDICINE | Facility: CLINIC | Age: 58
End: 2018-03-29
Payer: COMMERCIAL

## 2018-03-29 DIAGNOSIS — Z23 NEED FOR VACCINATION: Primary | ICD-10-CM

## 2018-03-31 PROBLEM — R79.89 LOW TESTOSTERONE LEVEL IN MALE: Status: ACTIVE | Noted: 2018-03-31

## 2018-03-31 NOTE — ASSESSMENT & PLAN NOTE
Chronic, uncontrolled, but improving.  Patient is taking the Sildenafil, is finding improvement of erections. We discussed how vasculogenic processes (e.g. HLD & prediabetes, in his case) are likely affecting his ability to achieve and maintain erections.  Patient doesn't currently have an active cardiovascular exercise routine.      We discussed that free testosterone is low.  Patient verbalizes understanding that taking testosterone can increase risk of CV disease, CVA, prostate cancer, male pattern baldness, shrinkage of testicles, breast enlargement, increased risk of blood clots, increased mood swings and/or aggression, decreased sperm count, infertility, polycythemia.  Patient is not willing to assume risks.  Therefore, we won't proceed with testosterone supplement at this time.    ROS is NEGATIVE for dizziness, generalized weakness/fatigue, cold sweats, dizziness,  vision/hearing changes, jaw pain/paresthesias, BUE pain/paresthesias/numbness/weakness, chest pain/pressure, palpitations, dyspnea, nausea, RUQ abdominal pain, oliguria/anuria, BLE edema.

## 2018-03-31 NOTE — ASSESSMENT & PLAN NOTE
We discussed that patient is diagnosed with prediabetes, well-controlled, given that the A1c is:   Lab Results   Component Value Date/Time    HBA1C 5.5 02/27/2018 03:44 PM        Patient is currently taking (no medications) for glycemic control, and (no medications indicated) for health maintenance related to diabetes mellitus.    We discussed that prediabetes/diabetes mellitus type 2 are medical conditions of lifestyle habits (less than optimal dietary choices, insufficient cardiovascular exercise), and that they can be controlled (in part or in whole) by these lifestyle changes.     Furthermore, we discussed that at present time it is better to pursue lifestyle changes rather than starting medications. Patient is in agreement.     Please see assessment/plan as below for further details.    ROS is NEGATIVE for blurred vision, polydipsia, polyuria, diaphoresis, palpitations, fatigue, irritability, flank pain, BLE paresthesias.

## 2018-03-31 NOTE — ASSESSMENT & PLAN NOTE
Patient and I discussed recent labs (see below; mixed dyslipidemia [HTG, low HDL]) and that ASCVD risk is increased based on most recent lipid panel, current blood pressure (non-hypertensive), diabetes status (prediabetic), and smoking status (non-smoker).    Patient and I then discussed necessary dietary changes to make to address dyslipidemia.  Patient verbalized understanding.    ROS is NEGATIVE for dizziness, generalized weakness/fatigue, vision/hearing changes, jaw pain/paresthesias, BUE pain/paresthesias/numbness/weakness, chest pain/pressure, palpitations, dyspnea, RUQ abdominal pain, oliguria/anuria, BLE edema.    Lab Results   Component Value Date/Time    CHOLSTRLTOT 182 12/14/2017 09:43 AM    LDL 94 12/14/2017 09:43 AM    HDL 39 (A) 12/14/2017 09:43 AM    TRIGLYCERIDE 243 (H) 12/14/2017 09:43 AM

## 2018-03-31 NOTE — PROGRESS NOTES
Subjective:   Chief Complaint/History of Present Illness:  Mika Mcmahon is a 57 y.o. male established patient who presents today to discuss medical problems as listed below    Diagnoses of Erectile dysfunction due to arterial insufficiency, Mixed dyslipidemia, and Prediabetes were pertinent to this visit.    Erectile dysfunction due to arterial insufficiency  Chronic, uncontrolled, but improving.  Patient is taking the Sildenafil, is finding improvement of erections. We discussed how vasculogenic processes (e.g. HLD & prediabetes, in his case) are likely affecting his ability to achieve and maintain erections.  Patient doesn't currently have an active cardiovascular exercise routine.      We discussed that free testosterone is low.  Patient verbalizes understanding that taking testosterone can increase risk of CV disease, CVA, prostate cancer, male pattern baldness, shrinkage of testicles, breast enlargement, increased risk of blood clots, increased mood swings and/or aggression, decreased sperm count, infertility, polycythemia.  Patient is not willing to assume risks.  Therefore, we won't proceed with testosterone supplement at this time.    ROS is NEGATIVE for dizziness, generalized weakness/fatigue, cold sweats, dizziness,  vision/hearing changes, jaw pain/paresthesias, BUE pain/paresthesias/numbness/weakness, chest pain/pressure, palpitations, dyspnea, nausea, RUQ abdominal pain, oliguria/anuria, BLE edema.    Mixed dyslipidemia  Patient and I discussed recent labs (see below; mixed dyslipidemia [HTG, low HDL]) and that ASCVD risk is increased based on most recent lipid panel, current blood pressure (non-hypertensive), diabetes status (prediabetic), and smoking status (non-smoker).    Patient and I then discussed necessary dietary changes to make to address dyslipidemia.  Patient verbalized understanding.    ROS is NEGATIVE for dizziness, generalized weakness/fatigue, vision/hearing changes, jaw  pain/paresthesias, BUE pain/paresthesias/numbness/weakness, chest pain/pressure, palpitations, dyspnea, RUQ abdominal pain, oliguria/anuria, BLE edema.    Lab Results   Component Value Date/Time    CHOLSTRLTOT 182 12/14/2017 09:43 AM    LDL 94 12/14/2017 09:43 AM    HDL 39 (A) 12/14/2017 09:43 AM    TRIGLYCERIDE 243 (H) 12/14/2017 09:43 AM         Prediabetes  We discussed that patient is diagnosed with prediabetes, well-controlled, given that the A1c is:   Lab Results   Component Value Date/Time    HBA1C 5.5 02/27/2018 03:44 PM        Patient is currently taking (no medications) for glycemic control, and (no medications indicated) for health maintenance related to diabetes mellitus.    We discussed that prediabetes/diabetes mellitus type 2 are medical conditions of lifestyle habits (less than optimal dietary choices, insufficient cardiovascular exercise), and that they can be controlled (in part or in whole) by these lifestyle changes.     Furthermore, we discussed that at present time it is better to pursue lifestyle changes rather than starting medications. Patient is in agreement.     Please see assessment/plan as below for further details.    ROS is NEGATIVE for blurred vision, polydipsia, polyuria, diaphoresis, palpitations, fatigue, irritability, flank pain, BLE paresthesias.      Patient Active Problem List    Diagnosis Date Noted   • Ulnar neuropathy at elbow of left upper extremity 02/27/2018   • Prediabetes 12/27/2017   • Mixed dyslipidemia 12/15/2017   • Irritant contact dermatitis due to other agents 12/14/2017   • Erectile dysfunction due to arterial insufficiency 12/14/2017   • Carpal tunnel syndrome of left wrist 11/14/2017   • Depression with anxiety 11/14/2017   • Obesity (BMI 30-39.9) 11/14/2017   • Multiple acquired skin tags 11/14/2017       Additional History:   Allergies:    Patient has no known allergies.     Current Medications:     Current Outpatient Prescriptions   Medication Sig Dispense  "Refill   • sildenafil (REVATIO) 20 MG tablet Take 1-5 Tabs by mouth 1 time daily as needed. 30 Tab 0   • sildenafil citrate (VIAGRA) 100 MG tablet Take 1 Tab by mouth 1 time daily as needed for Erectile Dysfunction. 10 Tab 3   • ALPRAZolam (XANAX) 0.25 MG Tab Take 0.25 mg by mouth at bedtime as needed for Sleep.     • ibuprofen (MOTRIN) 600 MG Tab Take 1 Tab by mouth every 6 hours as needed. 30 Tab 0     No current facility-administered medications for this visit.         Social History:     Social History   Substance Use Topics   • Smoking status: Former Smoker   • Smokeless tobacco: Never Used   • Alcohol use No       ROS:     - NOTE: All other systems reviewed and are negative, except as in HPI.     Objective:   Physical Exam:    Vitals: Blood pressure 124/76, pulse (!) 54, temperature 36.9 °C (98.5 °F), height 1.702 m (5' 7\"), weight 88.9 kg (196 lb), SpO2 97 %.   BMI: Body mass index is 30.7 kg/m².   General/Constitutional: Vitals as above, Well nourished, well developed male in no acute distress   Head/Eyes: Head is grossly normal & atraumatic, bilateral conjunctivae clear and not injected, bilateral EOMI, bilateral PERRL   ENT: Bilateral external ears grossly normal in appearance, Hearing grossly intact, External nares normal in appearance and without discharge/bleeding   Respiratory: No respiratory distress, bilateral lungs are clear to ausculation in all lung fields (anterior/lateral/posterior), no wheezing/rhonchi/rales   Cardiovascular: Regular rate and rhythm without murmur/gallops/rubs, distal pulses are intact and equal bilaterally (radial, posterior tibial), no bilateral lower extremity edema   MSK: Gait grossly normal & not antalgic   Integumentary: No apparent rashes   Psych: Judgment grossly appropriate, no apparent depression/anxiety    Health Maintenance:     - Order for GI Referral for colonoscopy placed at last visit (02/27/18), patient to self-schedule.    Imaging/Labs:     - 02/28/18 -- Low " free testosterone, red blood cells are ok    Assessment and Plan:   1. Erectile dysfunction due to arterial insufficiency  Chronic, uncontrolled, improving.  Patient continues to use Sildenafil, would like to compare prices, therefore GoodRx coupons provided, and patient will investigate where this medication will be most cost-effective for him.   - sildenafil (REVATIO) 20 MG tablet; Take 1-5 Tabs by mouth 1 time daily as needed.  Dispense: 30 Tab; Refill: 0   - sildenafil citrate (VIAGRA) 100 MG tablet; Take 1 Tab by mouth 1 time daily as needed for Erectile Dysfunction.  Dispense: 10 Tab; Refill: 3    2. Mixed dyslipidemia  Chronic, uncontrolled, patient advised on dietary changes that can help him to improve his DLD.    3. Prediabetes  Chronic, stable, well-controlled on no medications.  Continue with lifestyle changes.        RTC: in 3months for management of ED, discussion of DLD..    PLEASE NOTE: This dictation was created using voice recognition software. I have made every reasonable attempt to correct obvious errors, but I expect that there are errors of grammar and possibly content that I did not discover before finalizing the note.

## 2018-04-14 PROCEDURE — 90746 HEPB VACCINE 3 DOSE ADULT IM: CPT | Performed by: PREVENTIVE MEDICINE

## 2018-06-21 ENCOUNTER — TELEPHONE (OUTPATIENT)
Dept: MEDICAL GROUP | Facility: MEDICAL CENTER | Age: 58
End: 2018-06-21

## 2018-06-21 NOTE — TELEPHONE ENCOUNTER
ESTABLISHED PATIENT PRE-VISIT PLANNING     Note: Patient will not be contacted if there is no indication to call.     1.  Reviewed notes from the last few office visits within the medical group: Yes  03/27/2018  2.  If any orders were placed at last visit or intended to be done for this visit (i.e. 6 mos follow-up), do we have Results/Consult Notes?        •  Labs - Labs were not ordered at last office visit.   Note: If patient appointment is for lab review and patient did not complete labs, check with provider if OK to reschedule patient until labs completed.       •  Referrals - Referral ordered, patient has NOT been seen.    3. Is this appointment scheduled as a Hospital Follow-Up? No    4.  Immunizations were updated in [facility] using Web[employer]?: Yes       •  Web [employer] Recommendations: TDAP    5.  Patient is due for the following Health Maintenance Topics:   Health Maintenance Due   Topic Date Due   • IMM DTaP/Tdap/Td Vaccine (1 - Tdap) 09/30/1980   • COLONOSCOPY /Lynn from medical records will fax over records.  09/30/2011       6.  MDX printed for Provider? NO    7.  Patient was NOT informed to arrive 15 min prior to their scheduled appointment and bring in their medication bottles.

## 2018-08-12 DIAGNOSIS — N52.01 ERECTILE DYSFUNCTION DUE TO ARTERIAL INSUFFICIENCY: ICD-10-CM

## 2018-08-13 RX ORDER — SILDENAFIL CITRATE 20 MG/1
20-100 TABLET ORAL
Qty: 30 TAB | Refills: 5 | Status: SHIPPED | OUTPATIENT
Start: 2018-08-13 | End: 2019-01-24

## 2018-08-13 NOTE — TELEPHONE ENCOUNTER
Was the patient seen in the last year in this department? Yes    Does patient have an active prescription for medications requested? No     Received Request Via: Patient

## 2019-01-24 ENCOUNTER — HOSPITAL ENCOUNTER (EMERGENCY)
Facility: MEDICAL CENTER | Age: 59
End: 2019-01-24
Attending: EMERGENCY MEDICINE
Payer: COMMERCIAL

## 2019-01-24 ENCOUNTER — APPOINTMENT (OUTPATIENT)
Dept: RADIOLOGY | Facility: MEDICAL CENTER | Age: 59
End: 2019-01-24
Attending: EMERGENCY MEDICINE
Payer: COMMERCIAL

## 2019-01-24 VITALS
DIASTOLIC BLOOD PRESSURE: 89 MMHG | HEART RATE: 63 BPM | SYSTOLIC BLOOD PRESSURE: 123 MMHG | RESPIRATION RATE: 18 BRPM | BODY MASS INDEX: 31.94 KG/M2 | OXYGEN SATURATION: 97 % | WEIGHT: 203.48 LBS | HEIGHT: 67 IN | TEMPERATURE: 98.6 F

## 2019-01-24 DIAGNOSIS — R42 DIZZINESS: ICD-10-CM

## 2019-01-24 DIAGNOSIS — R07.9 CHEST PAIN, UNSPECIFIED TYPE: ICD-10-CM

## 2019-01-24 LAB
ALBUMIN SERPL BCP-MCNC: 4.2 G/DL (ref 3.2–4.9)
ALBUMIN/GLOB SERPL: 1.1 G/DL
ALP SERPL-CCNC: 64 U/L (ref 30–99)
ALT SERPL-CCNC: 33 U/L (ref 2–50)
ANION GAP SERPL CALC-SCNC: 10 MMOL/L (ref 0–11.9)
APTT PPP: 29.1 SEC (ref 24.7–36)
AST SERPL-CCNC: 30 U/L (ref 12–45)
BASOPHILS # BLD AUTO: 0.5 % (ref 0–1.8)
BASOPHILS # BLD: 0.04 K/UL (ref 0–0.12)
BILIRUB SERPL-MCNC: 0.9 MG/DL (ref 0.1–1.5)
BNP SERPL-MCNC: 24 PG/ML (ref 0–100)
BUN SERPL-MCNC: 14 MG/DL (ref 8–22)
CALCIUM SERPL-MCNC: 8.9 MG/DL (ref 8.4–10.2)
CHLORIDE SERPL-SCNC: 105 MMOL/L (ref 96–112)
CO2 SERPL-SCNC: 22 MMOL/L (ref 20–33)
CREAT SERPL-MCNC: 0.84 MG/DL (ref 0.5–1.4)
D DIMER PPP IA.FEU-MCNC: <0.4 UG/ML (FEU) (ref 0–0.5)
EKG IMPRESSION: NORMAL
EOSINOPHIL # BLD AUTO: 0.08 K/UL (ref 0–0.51)
EOSINOPHIL NFR BLD: 1 % (ref 0–6.9)
ERYTHROCYTE [DISTWIDTH] IN BLOOD BY AUTOMATED COUNT: 38.8 FL (ref 35.9–50)
GLOBULIN SER CALC-MCNC: 3.7 G/DL (ref 1.9–3.5)
GLUCOSE SERPL-MCNC: 93 MG/DL (ref 65–99)
HCT VFR BLD AUTO: 45.4 % (ref 42–52)
HGB BLD-MCNC: 15.7 G/DL (ref 14–18)
IMM GRANULOCYTES # BLD AUTO: 0.02 K/UL (ref 0–0.11)
IMM GRANULOCYTES NFR BLD AUTO: 0.3 % (ref 0–0.9)
INR PPP: 0.98 (ref 0.87–1.13)
LIPASE SERPL-CCNC: 35 U/L (ref 7–58)
LYMPHOCYTES # BLD AUTO: 2.68 K/UL (ref 1–4.8)
LYMPHOCYTES NFR BLD: 34.3 % (ref 22–41)
MCH RBC QN AUTO: 27.8 PG (ref 27–33)
MCHC RBC AUTO-ENTMCNC: 34.6 G/DL (ref 33.7–35.3)
MCV RBC AUTO: 80.5 FL (ref 81.4–97.8)
MONOCYTES # BLD AUTO: 0.54 K/UL (ref 0–0.85)
MONOCYTES NFR BLD AUTO: 6.9 % (ref 0–13.4)
NEUTROPHILS # BLD AUTO: 4.45 K/UL (ref 1.82–7.42)
NEUTROPHILS NFR BLD: 57 % (ref 44–72)
NRBC # BLD AUTO: 0 K/UL
NRBC BLD-RTO: 0 /100 WBC
PLATELET # BLD AUTO: 299 K/UL (ref 164–446)
PMV BLD AUTO: 8.9 FL (ref 9–12.9)
POTASSIUM SERPL-SCNC: 3.4 MMOL/L (ref 3.6–5.5)
PROT SERPL-MCNC: 7.9 G/DL (ref 6–8.2)
PROTHROMBIN TIME: 12.9 SEC (ref 12–14.6)
RBC # BLD AUTO: 5.64 M/UL (ref 4.7–6.1)
SODIUM SERPL-SCNC: 137 MMOL/L (ref 135–145)
TROPONIN I SERPL-MCNC: <0.02 NG/ML (ref 0–0.04)
TROPONIN I SERPL-MCNC: <0.02 NG/ML (ref 0–0.04)
WBC # BLD AUTO: 7.8 K/UL (ref 4.8–10.8)

## 2019-01-24 PROCEDURE — 71045 X-RAY EXAM CHEST 1 VIEW: CPT

## 2019-01-24 PROCEDURE — 99284 EMERGENCY DEPT VISIT MOD MDM: CPT

## 2019-01-24 PROCEDURE — 83690 ASSAY OF LIPASE: CPT

## 2019-01-24 PROCEDURE — 85025 COMPLETE CBC W/AUTO DIFF WBC: CPT

## 2019-01-24 PROCEDURE — 83880 ASSAY OF NATRIURETIC PEPTIDE: CPT

## 2019-01-24 PROCEDURE — 80053 COMPREHEN METABOLIC PANEL: CPT

## 2019-01-24 PROCEDURE — 93005 ELECTROCARDIOGRAM TRACING: CPT

## 2019-01-24 PROCEDURE — 85730 THROMBOPLASTIN TIME PARTIAL: CPT

## 2019-01-24 PROCEDURE — 84484 ASSAY OF TROPONIN QUANT: CPT

## 2019-01-24 PROCEDURE — 85610 PROTHROMBIN TIME: CPT

## 2019-01-24 PROCEDURE — 85379 FIBRIN DEGRADATION QUANT: CPT

## 2019-01-24 PROCEDURE — 36415 COLL VENOUS BLD VENIPUNCTURE: CPT

## 2019-01-24 PROCEDURE — 93005 ELECTROCARDIOGRAM TRACING: CPT | Performed by: EMERGENCY MEDICINE

## 2019-01-25 NOTE — ED NOTES
Discharge information provided. Pt verbalized understanding of discharge instructions to follow up with PCP and to return to ER if condition worsens. Pt ambulated out of ER in a steady gait, no additional questions or concerns. No new medications

## 2019-01-25 NOTE — ED PROVIDER NOTES
ED Provider Note    CHIEF COMPLAINT  Chief Complaint   Patient presents with   • Chest Pain     Right sided, started last night. Pt also reports intermittent neck pain bilaterally for past few weeks. Occasional SOB.    • Dizziness     Intermittent for past few weeks. Last episode 2 hours ago    • Leg Pain     Bilateral leg weakness and pain for past few weeks        HPI  Mika Mcmahon is a 58 y.o. male who presents with multiple symptoms.  Started last week he had a feeling of hotness and pain in his legs he said he had a weak feeling as well.  He also has some right sided pain.  Is on the right lower chest wall.  He is also had some trapezius pain and occasional shortness of breath.  Is never had this in the past.  He does have a history of anxiety and feels short of breath with anxiety is out of his Xanax.  He has had a feeling of lightheadedness feeling moving.  No abdominal pain no substernal chest pain no nausea no vomiting no fever no chills.  All other systems negative    REVIEW OF SYSTEMS  See HPI for further details    PAST MEDICAL HISTORY  Past Medical History:   Diagnosis Date   • Psychiatric disorder     anxiety       FAMILY HISTORY  History reviewed. No pertinent family history.    SOCIAL HISTORY  Social History     Social History   • Marital status: Single     Spouse name: N/A   • Number of children: N/A   • Years of education: N/A     Social History Main Topics   • Smoking status: Former Smoker   • Smokeless tobacco: Never Used   • Alcohol use No   • Drug use: No   • Sexual activity: Not Currently     Partners: Female     Other Topics Concern   • Not on file     Social History Narrative   • No narrative on file       SURGICAL HISTORY  History reviewed. No pertinent surgical history.    CURRENT MEDICATIONS  Home Medications     Reviewed by Jenae Lanza R.N. (Registered Nurse) on 01/24/19 at 1903  Med List Status: Complete   Medication Last Dose Status   ALPRAZolam (XANAX) 0.25 MG Tab  "1/24/2019 Active   ibuprofen (MOTRIN) 600 MG Tab  Active                ALLERGIES  No Known Allergies    PHYSICAL EXAM  VITAL SIGNS: /89   Pulse 63   Temp 37 °C (98.6 °F) (Temporal)   Resp 18   Ht 1.702 m (5' 7\")   Wt 92.3 kg (203 lb 7.8 oz)   SpO2 97%   BMI 31.87 kg/m²     Constitutional: Patient is alert and oriented x3 in no distress   HENT: Moist mucous membranes  Eyes:   No conjunctivitis or icterus  Neck: Trach is midline about the  Lymphatic: Negative anterior cervical lymphadenopathy  Cardiovascular: Normal heart rate    Thorax & Lungs: Clear to auscultation nontender palpation right lateral chest wall  Back: No CVA tenderness  Abdomen: Soft nontender palpation no peritoneal findings guarding rigidity bowel sounds present  Neurologic: Normal motor sensation  Extremities: No edema  Psychiatric: Affect normal, Judgment normal, Mood normal.     Results for orders placed or performed during the hospital encounter of 01/24/19   CBC with Differential   Result Value Ref Range    WBC 7.8 4.8 - 10.8 K/uL    RBC 5.64 4.70 - 6.10 M/uL    Hemoglobin 15.7 14.0 - 18.0 g/dL    Hematocrit 45.4 42.0 - 52.0 %    MCV 80.5 (L) 81.4 - 97.8 fL    MCH 27.8 27.0 - 33.0 pg    MCHC 34.6 33.7 - 35.3 g/dL    RDW 38.8 35.9 - 50.0 fL    Platelet Count 299 164 - 446 K/uL    MPV 8.9 (L) 9.0 - 12.9 fL    Neutrophils-Polys 57.00 44.00 - 72.00 %    Lymphocytes 34.30 22.00 - 41.00 %    Monocytes 6.90 0.00 - 13.40 %    Eosinophils 1.00 0.00 - 6.90 %    Basophils 0.50 0.00 - 1.80 %    Immature Granulocytes 0.30 0.00 - 0.90 %    Nucleated RBC 0.00 /100 WBC    Neutrophils (Absolute) 4.45 1.82 - 7.42 K/uL    Lymphs (Absolute) 2.68 1.00 - 4.80 K/uL    Monos (Absolute) 0.54 0.00 - 0.85 K/uL    Eos (Absolute) 0.08 0.00 - 0.51 K/uL    Baso (Absolute) 0.04 0.00 - 0.12 K/uL    Immature Granulocytes (abs) 0.02 0.00 - 0.11 K/uL    NRBC (Absolute) 0.00 K/uL   Complete Metabolic Panel (CMP)   Result Value Ref Range    Sodium 137 135 - 145 mmol/L "    Potassium 3.4 (L) 3.6 - 5.5 mmol/L    Chloride 105 96 - 112 mmol/L    Co2 22 20 - 33 mmol/L    Anion Gap 10.0 0.0 - 11.9    Glucose 93 65 - 99 mg/dL    Bun 14 8 - 22 mg/dL    Creatinine 0.84 0.50 - 1.40 mg/dL    Calcium 8.9 8.4 - 10.2 mg/dL    AST(SGOT) 30 12 - 45 U/L    ALT(SGPT) 33 2 - 50 U/L    Alkaline Phosphatase 64 30 - 99 U/L    Total Bilirubin 0.9 0.1 - 1.5 mg/dL    Albumin 4.2 3.2 - 4.9 g/dL    Total Protein 7.9 6.0 - 8.2 g/dL    Globulin 3.7 (H) 1.9 - 3.5 g/dL    A-G Ratio 1.1 g/dL   Btype Natriuretic Peptide (BNP)   Result Value Ref Range    B Natriuretic Peptide 24 0 - 100 pg/mL   Prothrombin Time (PT/INR)   Result Value Ref Range    PT 12.9 12.0 - 14.6 sec    INR 0.98 0.87 - 1.13   APTT   Result Value Ref Range    APTT 29.1 24.7 - 36.0 sec   Lipase   Result Value Ref Range    Lipase 35 7 - 58 U/L   Troponin STAT   Result Value Ref Range    Troponin I <0.02 0.00 - 0.04 ng/mL   ESTIMATED GFR   Result Value Ref Range    GFR If African American >60 >60 mL/min/1.73 m 2    GFR If Non African American >60 >60 mL/min/1.73 m 2   TROPONIN   Result Value Ref Range    Troponin I <0.02 0.00 - 0.04 ng/mL   D-DIMER   Result Value Ref Range    D-Dimer Screen <0.40 0.00 - 0.50 ug/mL (FEU)   EKG   Result Value Ref Range    Report       [facility] Emergency Dept.    Test Date:  2019  Pt Name:    RM RAMOS                Department: [facility]  MRN:        [id number]                      Room:  Gender:     Male                         Technician:   :        1960                   Requested By:ER TRIAGE PROTOCOL  Order #:    [id number]                    Reading MD: JONATHAN REINA MD    Measurements  Intervals                                Axis  Rate:       71                           P:          66  AZ:         204                          QRS:        47  QRSD:       72                           T:          86  QT:         396  QTc:        431    Interpretive Statements  Normal  sinus rhythm with a rate of 71 normal corrected QT interval normal QRS    nonspecific ST changes only are noted.    Electronically Signed On 1- 20:15:32 PST by JONATHAN KELLER MD        Second EKG: Normal sinus rhythm rate 60 with a normal corrected QT interval normal QRS axis normal EKG    COURSE & MEDICAL DECISION MAKING  Pertinent Labs & Imaging studies reviewed. (See chart for details)  Patient has multiple symptoms with some atypical chest pain.  Delta troponin was done with nondetectable levels both times.  Also nondetectable d-dimer.  Other labs are unremarkable.    Unclear was causing the patient's pain.  He did asked to refill his Xanax I told him we cannot do that through the ER he understands he will follow-up with his physician is given return precautions and follow-up    FINAL IMPRESSION  1.   1. Chest pain, unspecified type    2. Dizziness        2.   3.         Electronically signed by: Jonathan Keller, 1/24/2019 8:12 PM

## 2019-01-25 NOTE — ED NOTES
Pt assessed, c/o leg pain and weakness, right sided CP, and bilateral neck pain for past few weeks. No fever or recent illness. All labs complete, EKG and chest xray complete   Will cont to monitor

## 2019-02-18 LAB — EKG IMPRESSION: NORMAL

## 2019-03-22 ENCOUNTER — OFFICE VISIT (OUTPATIENT)
Dept: MEDICAL GROUP | Facility: MEDICAL CENTER | Age: 59
End: 2019-03-22
Payer: COMMERCIAL

## 2019-03-22 VITALS
TEMPERATURE: 97.9 F | OXYGEN SATURATION: 96 % | SYSTOLIC BLOOD PRESSURE: 100 MMHG | BODY MASS INDEX: 31.08 KG/M2 | WEIGHT: 198 LBS | HEART RATE: 60 BPM | HEIGHT: 67 IN | DIASTOLIC BLOOD PRESSURE: 60 MMHG

## 2019-03-22 DIAGNOSIS — E78.2 MIXED DYSLIPIDEMIA: ICD-10-CM

## 2019-03-22 DIAGNOSIS — Z00.00 ANNUAL PHYSICAL EXAM: ICD-10-CM

## 2019-03-22 DIAGNOSIS — Z12.5 PROSTATE CANCER SCREENING: ICD-10-CM

## 2019-03-22 DIAGNOSIS — F41.8 DEPRESSION WITH ANXIETY: ICD-10-CM

## 2019-03-22 DIAGNOSIS — R73.03 PREDIABETES: ICD-10-CM

## 2019-03-22 PROCEDURE — 99214 OFFICE O/P EST MOD 30 MIN: CPT | Performed by: FAMILY MEDICINE

## 2019-03-22 RX ORDER — ALPRAZOLAM 0.5 MG/1
0.25 TABLET ORAL 2 TIMES DAILY PRN
Qty: 30 TAB | Refills: 0 | Status: SHIPPED | OUTPATIENT
Start: 2019-03-22 | End: 2019-04-21

## 2019-03-22 RX ORDER — ALPRAZOLAM 0.5 MG/1
0.25 TABLET ORAL 2 TIMES DAILY PRN
Qty: 30 TAB | Refills: 0 | Status: SHIPPED | OUTPATIENT
Start: 2019-03-22 | End: 2019-03-22 | Stop reason: SDUPTHER

## 2019-03-22 ASSESSMENT — PATIENT HEALTH QUESTIONNAIRE - PHQ9: CLINICAL INTERPRETATION OF PHQ2 SCORE: 0

## 2019-03-22 NOTE — ASSESSMENT & PLAN NOTE
"Chronic, uncontrolled, patient with increased stress/anxiety that occurs intermittently secondary to work related concerns and/or the fact that girlfriend has moved away proximally 4 months ago.  He is uncertain of the current situation as she moved away for her job and they are not in as regular contact as they used to be previously.    Patient uses his alprazolam once every few weeks, mainly at night to help him go to sleep.  Patient is not drinking alcohol, patient is not using any illicit drugs.    Patient did have a panic attack approximately 2weeks ago with increased tremulousness/shaking, dyspnea, increased stress, pressure/\"tipping\" sensation on the left side.  This is not a common occurrence, and he doesn't usually have a panic attack.    ROS is NEGATIVE for generalized weakness/fatigue, dizziness, vision/hearing changes, chest pain/pressure, palpitations, dyspnea, tachypnea, intense feelings of dread, insomnia, decreased appetite, persistent nausea.  "

## 2019-03-22 NOTE — ASSESSMENT & PLAN NOTE
Chronic, unknown control, will get labs.    ROS is NEGATIVE for dizziness, generalized weakness/fatigue, cold sweats,  vision/hearing changes, jaw pain/paresthesias, BUE pain/paresthesias/numbness/weakness, chest pain/pressure, palpitations, dyspnea, nausea, BLE edema.

## 2019-03-22 NOTE — ASSESSMENT & PLAN NOTE
Chronic, unknown control, no medications, will get labs to recheck.  Last A1c was well-controlled.

## 2019-03-22 NOTE — PROGRESS NOTES
"Subjective:   Chief Complaint/History of Present Illness:  Mika Mcmahon is a 58 y.o. male established patient who presents today to discuss medical problems as listed below    Diagnoses of Depression with anxiety, Mixed dyslipidemia, Prediabetes, Annual physical exam, and Prostate cancer screening were pertinent to this visit.    Depression with anxiety  Chronic, uncontrolled, patient with increased stress/anxiety that occurs intermittently secondary to work related concerns and/or the fact that girlfriend has moved away proximally 4 months ago.  He is uncertain of the current situation as she moved away for her job and they are not in as regular contact as they used to be previously.    Patient uses his alprazolam once every few weeks, mainly at night to help him go to sleep.  Patient is not drinking alcohol, patient is not using any illicit drugs.    Patient did have a panic attack approximately 2weeks ago with increased tremulousness/shaking, dyspnea, increased stress, pressure/\"tipping\" sensation on the left side.  This is not a common occurrence, and he doesn't usually have a panic attack.    ROS is NEGATIVE for generalized weakness/fatigue, dizziness, vision/hearing changes, chest pain/pressure, palpitations, dyspnea, tachypnea, intense feelings of dread, insomnia, decreased appetite, persistent nausea.    Mixed dyslipidemia  Chronic, unknown control, will get labs.    ROS is NEGATIVE for dizziness, generalized weakness/fatigue, cold sweats,  vision/hearing changes, jaw pain/paresthesias, BUE pain/paresthesias/numbness/weakness, chest pain/pressure, palpitations, dyspnea, nausea, BLE edema.    Prediabetes  Chronic, unknown control, no medications, will get labs to recheck.  Last A1c was well-controlled.      Patient Active Problem List    Diagnosis Date Noted   • Low testosterone level in male 03/31/2018   • Ulnar neuropathy at elbow of left upper extremity 02/27/2018   • Prediabetes 12/27/2017   • " "Mixed dyslipidemia 12/15/2017   • Irritant contact dermatitis due to other agents 12/14/2017   • Erectile dysfunction due to arterial insufficiency 12/14/2017   • Carpal tunnel syndrome of left wrist 11/14/2017   • Depression with anxiety 11/14/2017   • Obesity (BMI 30-39.9) 11/14/2017   • Multiple acquired skin tags 11/14/2017       Additional History:   Allergies:    Patient has no known allergies.     Current Medications:     Current Outpatient Prescriptions   Medication Sig Dispense Refill   • ALPRAZolam (XANAX) 0.5 MG Tab Take 0.5 Tabs by mouth 2 times a day as needed for Sleep or Anxiety for up to 30 days. 30 Tab 0   • ibuprofen (MOTRIN) 600 MG Tab Take 1 Tab by mouth every 6 hours as needed. 30 Tab 0     No current facility-administered medications for this visit.         Social History:     Social History   Substance Use Topics   • Smoking status: Former Smoker   • Smokeless tobacco: Never Used   • Alcohol use No       ROS:     - NOTE: All other systems reviewed and are negative, except as in HPI.     Objective:   Physical Exam:    Vitals: Blood pressure 100/60, pulse 60, temperature 36.6 °C (97.9 °F), height 1.702 m (5' 7.01\"), weight 89.8 kg (198 lb), SpO2 96 %.   BMI: Body mass index is 31 kg/m².   General/Constitutional: Vitals as above, Well nourished, well developed male in no acute distress   Head/Eyes: Head is grossly normal & atraumatic, bilateral conjunctivae clear and not injected, bilateral EOMI, bilateral PERRL   ENT: Bilateral external ears grossly normal in appearance, Hearing grossly intact, External nares normal in appearance and without discharge/bleeding   Respiratory: No respiratory distress, bilateral lungs are clear to ausculation in all lung fields (anterior/lateral/posterior), no wheezing/rhonchi/rales   Cardiovascular: Regular rate and rhythm without murmur/gallops/rubs, distal pulses are intact and equal bilaterally (radial, posterior tibial), no bilateral lower extremity " edema   MSK: Gait grossly normal & not antalgic   Integumentary: No apparent rashes   Psych: Judgment grossly appropriate, no apparent depression/anxiety    Health Maintenance:     - Kenneth is up-to-date    Imaging/Labs:     - UDS will be obtained at next visit however may be negative as patient is taking this medication very infrequently    Assessment and Plan:   1. Depression with anxiety  Chronic, uncontrolled, patient to continue taking Xanax on an as-needed basis.   - Comp Metabolic Panel; Future   - ALPRAZolam (XANAX) 0.5 MG Tab; Take 0.5 Tabs by mouth 2 times a day as needed for Sleep or Anxiety for up to 30 days.  Dispense: 30 Tab; Refill: 0    2. Mixed dyslipidemia  Chronic, unknown control, obtain labs as below prior to annual exam.  Patient to work on healthy lifestyle changes.   - Lipid Profile; Future    3. Prediabetes  Chronic, unknown control, obtain labs as below prior to annual exam.  Patient to work on healthy lifestyle changes.   - HEMOGLOBIN A1C; Future    4. Annual physical exam  5. Prostate cancer screening  Labs ordered for annual medical exam   - HEMOGLOBIN A1C; Future   - Comp Metabolic Panel; Future   - Lipid Profile; Future   - PROSTATE SPECIFIC AG SCREENING; Future        RTC: In 6 months for annual medical exam.    PLEASE NOTE: This dictation was created using voice recognition software. I have made every reasonable attempt to correct obvious errors, but I expect that there are errors of grammar and possibly content that I did not discover before finalizing the note.

## 2019-03-27 ENCOUNTER — HOSPITAL ENCOUNTER (OUTPATIENT)
Facility: MEDICAL CENTER | Age: 59
End: 2019-03-27
Payer: COMMERCIAL

## 2019-03-27 LAB
BDY FAT % MEASURED: 36.3 %
BP DIAS: 84 MMHG
BP SYS: 130 MMHG
CHOLEST SERPL-MCNC: 220 MG/DL (ref 100–199)
DIABETES HTDIA: NO
EVENT NAME HTEVT: NORMAL
FASTING STATUS PATIENT QL REPORTED: NORMAL
GLUCOSE SERPL-MCNC: 90 MG/DL (ref 65–99)
HDLC SERPL-MCNC: 34 MG/DL
HYPERTENSION HTHYP: NO
LDLC SERPL CALC-MCNC: ABNORMAL MG/DL
SCREENING LOC CITY HTCIT: NORMAL
SCREENING LOC STATE HTSTA: NORMAL
SCREENING LOCATION HTLOC: NORMAL
SUBSCRIBER ID HTSID: NORMAL
TRIGL SERPL-MCNC: 480 MG/DL (ref 0–149)

## 2019-04-23 ENCOUNTER — OFFICE VISIT (OUTPATIENT)
Dept: URGENT CARE | Facility: CLINIC | Age: 59
End: 2019-04-23
Payer: COMMERCIAL

## 2019-04-23 VITALS
HEIGHT: 67 IN | HEART RATE: 87 BPM | WEIGHT: 202 LBS | DIASTOLIC BLOOD PRESSURE: 68 MMHG | RESPIRATION RATE: 16 BRPM | SYSTOLIC BLOOD PRESSURE: 118 MMHG | BODY MASS INDEX: 31.71 KG/M2 | TEMPERATURE: 98.3 F | OXYGEN SATURATION: 99 %

## 2019-04-23 DIAGNOSIS — H65.01 RIGHT ACUTE SEROUS OTITIS MEDIA, RECURRENCE NOT SPECIFIED: ICD-10-CM

## 2019-04-23 DIAGNOSIS — S46.811A TRAPEZIUS STRAIN, RIGHT, INITIAL ENCOUNTER: ICD-10-CM

## 2019-04-23 PROCEDURE — 99214 OFFICE O/P EST MOD 30 MIN: CPT | Performed by: NURSE PRACTITIONER

## 2019-04-23 RX ORDER — FLUTICASONE PROPIONATE 50 MCG
1 SPRAY, SUSPENSION (ML) NASAL DAILY
Qty: 16 G | Refills: 2 | Status: SHIPPED | OUTPATIENT
Start: 2019-04-23

## 2019-04-23 RX ORDER — NAPROXEN 500 MG/1
500 TABLET ORAL 2 TIMES DAILY WITH MEALS
Qty: 60 TAB | Refills: 0 | Status: SHIPPED | OUTPATIENT
Start: 2019-04-23

## 2019-04-23 ASSESSMENT — ENCOUNTER SYMPTOMS
NECK PAIN: 1
MYALGIAS: 1
CHILLS: 0
DIZZINESS: 1
NAUSEA: 1
EYE DISCHARGE: 0
SORE THROAT: 0
FEVER: 0
ORTHOPNEA: 0
HEADACHES: 0
DIARRHEA: 0
COUGH: 0

## 2019-04-23 NOTE — PROGRESS NOTES
Subjective:      Mika Mcmahon is a 58 y.o. male who presents with Dizziness (s3cmytxd off and on, x1 day dizziness, pain behind (L) ear, stabbing pain )            HPI New problem. 58 year old male with dizziness on and off for 3 months as well as right sided neck pain for 3 months, again on and off. Denies any fever, chills, coughing. He does report congestion and some nausea. Denies any vomiting with this. He has not taken any medication for either of these symptoms. ED eval in Feb with labs and EKG for the dizziness were wnl.  Patient has no known allergies.  Current Outpatient Prescriptions on File Prior to Visit   Medication Sig Dispense Refill   • ibuprofen (MOTRIN) 600 MG Tab Take 1 Tab by mouth every 6 hours as needed. 30 Tab 0     No current facility-administered medications on file prior to visit.      Social History     Social History   • Marital status: Single     Spouse name: N/A   • Number of children: N/A   • Years of education: N/A     Occupational History   • Not on file.     Social History Main Topics   • Smoking status: Former Smoker   • Smokeless tobacco: Never Used   • Alcohol use No   • Drug use: No   • Sexual activity: Not Currently     Partners: Female     Other Topics Concern   • Not on file     Social History Narrative   • No narrative on file     family history includes Cancer in his brother; No Known Problems in his father, maternal grandfather, maternal grandmother, paternal grandfather, and paternal grandmother; Other in his mother.      Review of Systems   Constitutional: Positive for malaise/fatigue. Negative for chills and fever.   HENT: Positive for congestion. Negative for sore throat.    Eyes: Negative for discharge.   Respiratory: Negative for cough.    Cardiovascular: Negative for chest pain and orthopnea.   Gastrointestinal: Positive for nausea. Negative for diarrhea.   Musculoskeletal: Positive for myalgias and neck pain.   Neurological: Positive for dizziness.  "Negative for headaches.   Endo/Heme/Allergies: Negative for environmental allergies.          Objective:     /68   Pulse 87   Temp 36.8 °C (98.3 °F) (Temporal)   Resp 16   Ht 1.702 m (5' 7\")   Wt 91.6 kg (202 lb)   SpO2 99%   BMI 31.64 kg/m²      Physical Exam   Constitutional: He is oriented to person, place, and time. He appears well-developed and well-nourished. No distress.   HENT:   Head: Normocephalic and atraumatic.   Right Ear: External ear and ear canal normal. Tympanic membrane is not injected and not perforated. No middle ear effusion.   Left Ear: External ear and ear canal normal. Tympanic membrane is not injected and not perforated.  No middle ear effusion.   Nose: Mucosal edema present.   Mouth/Throat: Posterior oropharyngeal erythema present. No oropharyngeal exudate.   Eyes: Conjunctivae are normal. Right eye exhibits no discharge. Left eye exhibits no discharge.   Neck: Neck supple. Muscular tenderness present. Decreased range of motion present.       Cardiovascular: Normal rate, regular rhythm and normal heart sounds.    No murmur heard.  Pulmonary/Chest: Effort normal and breath sounds normal. No respiratory distress.   Musculoskeletal:   Normal movement of all 4 extremities.   Lymphadenopathy:     He has no cervical adenopathy.        Right: No supraclavicular adenopathy present.        Left: No supraclavicular adenopathy present.   Neurological: He is alert and oriented to person, place, and time. Gait normal.   Skin: Skin is warm and dry.   Psychiatric: He has a normal mood and affect. His behavior is normal. Thought content normal.   Nursing note and vitals reviewed.              Assessment/Plan:     1. Trapezius strain, right, initial encounter  naproxen (NAPROSYN) 500 MG Tab   2. Right acute serous otitis media, recurrence not specified  fluticasone (FLONASE) 50 MCG/ACT nasal spray     OTC claritin, zyrtec or allegra.  Flonase.  Naprosyn and gentle ROM for his neck " issue.  Follow up with PCP for persistent, ongoing symptoms.

## 2019-06-14 ENCOUNTER — OFFICE VISIT (OUTPATIENT)
Dept: URGENT CARE | Facility: CLINIC | Age: 59
End: 2019-06-14
Payer: COMMERCIAL

## 2019-06-14 ENCOUNTER — HOSPITAL ENCOUNTER (EMERGENCY)
Facility: MEDICAL CENTER | Age: 59
End: 2019-06-14
Payer: COMMERCIAL

## 2019-06-14 VITALS
OXYGEN SATURATION: 96 % | HEIGHT: 67 IN | SYSTOLIC BLOOD PRESSURE: 106 MMHG | HEART RATE: 76 BPM | DIASTOLIC BLOOD PRESSURE: 72 MMHG | BODY MASS INDEX: 31.71 KG/M2 | WEIGHT: 202 LBS | TEMPERATURE: 98.3 F | RESPIRATION RATE: 16 BRPM

## 2019-06-14 VITALS
HEART RATE: 59 BPM | OXYGEN SATURATION: 98 % | SYSTOLIC BLOOD PRESSURE: 116 MMHG | WEIGHT: 203.93 LBS | BODY MASS INDEX: 32.01 KG/M2 | HEIGHT: 67 IN | DIASTOLIC BLOOD PRESSURE: 80 MMHG | RESPIRATION RATE: 15 BRPM | TEMPERATURE: 98 F

## 2019-06-14 DIAGNOSIS — R20.0 LEFT FACIAL NUMBNESS: ICD-10-CM

## 2019-06-14 DIAGNOSIS — R07.9 CHEST PAIN, UNSPECIFIED TYPE: ICD-10-CM

## 2019-06-14 PROCEDURE — 302449 STATCHG TRIAGE ONLY (STATISTIC)

## 2019-06-14 PROCEDURE — 99214 OFFICE O/P EST MOD 30 MIN: CPT | Performed by: EMERGENCY MEDICINE

## 2019-06-14 ASSESSMENT — ENCOUNTER SYMPTOMS
DIAPHORESIS: 0
BLURRED VISION: 0
FOCAL WEAKNESS: 0
FEVER: 0
SHORTNESS OF BREATH: 0
PALPITATIONS: 0
DIZZINESS: 0
LOWER EXTREMITY EDEMA: 0
NAUSEA: 0
VOMITING: 0
TINGLING: 0
DOUBLE VISION: 0
ABDOMINAL PAIN: 0
HEADACHES: 0

## 2019-06-15 ENCOUNTER — APPOINTMENT (OUTPATIENT)
Dept: RADIOLOGY | Facility: MEDICAL CENTER | Age: 59
End: 2019-06-15
Attending: EMERGENCY MEDICINE
Payer: COMMERCIAL

## 2019-06-15 ENCOUNTER — HOSPITAL ENCOUNTER (EMERGENCY)
Facility: MEDICAL CENTER | Age: 59
End: 2019-06-15
Attending: EMERGENCY MEDICINE
Payer: COMMERCIAL

## 2019-06-15 VITALS
SYSTOLIC BLOOD PRESSURE: 131 MMHG | DIASTOLIC BLOOD PRESSURE: 74 MMHG | HEIGHT: 67 IN | BODY MASS INDEX: 31.76 KG/M2 | RESPIRATION RATE: 18 BRPM | WEIGHT: 202.38 LBS | HEART RATE: 74 BPM | OXYGEN SATURATION: 95 % | TEMPERATURE: 97.3 F

## 2019-06-15 DIAGNOSIS — R06.00 DYSPNEA, UNSPECIFIED TYPE: ICD-10-CM

## 2019-06-15 LAB
ALBUMIN SERPL BCP-MCNC: 3.2 G/DL (ref 3.2–4.9)
ALBUMIN/GLOB SERPL: 1 G/DL
ALP SERPL-CCNC: 55 U/L (ref 30–99)
ALT SERPL-CCNC: 26 U/L (ref 2–50)
ANION GAP SERPL CALC-SCNC: 7 MMOL/L (ref 0–11.9)
AST SERPL-CCNC: 30 U/L (ref 12–45)
BASOPHILS # BLD AUTO: 0.5 % (ref 0–1.8)
BASOPHILS # BLD: 0.04 K/UL (ref 0–0.12)
BILIRUB SERPL-MCNC: 0.8 MG/DL (ref 0.1–1.5)
BUN SERPL-MCNC: 8 MG/DL (ref 8–22)
CALCIUM SERPL-MCNC: 8.1 MG/DL (ref 8.4–10.2)
CHLORIDE SERPL-SCNC: 109 MMOL/L (ref 96–112)
CO2 SERPL-SCNC: 24 MMOL/L (ref 20–33)
CREAT SERPL-MCNC: 0.82 MG/DL (ref 0.5–1.4)
D DIMER PPP IA.FEU-MCNC: <0.4 UG/ML (FEU) (ref 0–0.5)
EKG IMPRESSION: NORMAL
EOSINOPHIL # BLD AUTO: 0.21 K/UL (ref 0–0.51)
EOSINOPHIL NFR BLD: 2.5 % (ref 0–6.9)
ERYTHROCYTE [DISTWIDTH] IN BLOOD BY AUTOMATED COUNT: 40.2 FL (ref 35.9–50)
GLOBULIN SER CALC-MCNC: 3.1 G/DL (ref 1.9–3.5)
GLUCOSE SERPL-MCNC: 110 MG/DL (ref 65–99)
HCT VFR BLD AUTO: 44 % (ref 42–52)
HGB BLD-MCNC: 15.2 G/DL (ref 14–18)
IMM GRANULOCYTES # BLD AUTO: 0.02 K/UL (ref 0–0.11)
IMM GRANULOCYTES NFR BLD AUTO: 0.2 % (ref 0–0.9)
LIPASE SERPL-CCNC: 42 U/L (ref 7–58)
LYMPHOCYTES # BLD AUTO: 3.44 K/UL (ref 1–4.8)
LYMPHOCYTES NFR BLD: 41 % (ref 22–41)
MCH RBC QN AUTO: 28 PG (ref 27–33)
MCHC RBC AUTO-ENTMCNC: 34.5 G/DL (ref 33.7–35.3)
MCV RBC AUTO: 81.2 FL (ref 81.4–97.8)
MONOCYTES # BLD AUTO: 0.67 K/UL (ref 0–0.85)
MONOCYTES NFR BLD AUTO: 8 % (ref 0–13.4)
NEUTROPHILS # BLD AUTO: 4.02 K/UL (ref 1.82–7.42)
NEUTROPHILS NFR BLD: 47.8 % (ref 44–72)
NRBC # BLD AUTO: 0 K/UL
NRBC BLD-RTO: 0 /100 WBC
PLATELET # BLD AUTO: 274 K/UL (ref 164–446)
PMV BLD AUTO: 8.9 FL (ref 9–12.9)
POTASSIUM SERPL-SCNC: 3.3 MMOL/L (ref 3.6–5.5)
PROT SERPL-MCNC: 6.3 G/DL (ref 6–8.2)
RBC # BLD AUTO: 5.42 M/UL (ref 4.7–6.1)
SODIUM SERPL-SCNC: 140 MMOL/L (ref 135–145)
TROPONIN I SERPL-MCNC: <0.02 NG/ML (ref 0–0.04)
WBC # BLD AUTO: 8.4 K/UL (ref 4.8–10.8)

## 2019-06-15 PROCEDURE — 71046 X-RAY EXAM CHEST 2 VIEWS: CPT

## 2019-06-15 PROCEDURE — 80053 COMPREHEN METABOLIC PANEL: CPT

## 2019-06-15 PROCEDURE — 99284 EMERGENCY DEPT VISIT MOD MDM: CPT

## 2019-06-15 PROCEDURE — 93005 ELECTROCARDIOGRAM TRACING: CPT | Performed by: EMERGENCY MEDICINE

## 2019-06-15 PROCEDURE — 94760 N-INVAS EAR/PLS OXIMETRY 1: CPT

## 2019-06-15 PROCEDURE — 83690 ASSAY OF LIPASE: CPT

## 2019-06-15 PROCEDURE — 85379 FIBRIN DEGRADATION QUANT: CPT

## 2019-06-15 PROCEDURE — 85025 COMPLETE CBC W/AUTO DIFF WBC: CPT

## 2019-06-15 PROCEDURE — 36415 COLL VENOUS BLD VENIPUNCTURE: CPT

## 2019-06-15 PROCEDURE — 84484 ASSAY OF TROPONIN QUANT: CPT

## 2019-06-15 NOTE — ED NOTES
D/c inst reviewed w/ the pt the pt denies questions.  The pt verb understanding to f/u op w/ pcp.  Return as needed. The pt amb out of the ed w/o diff and the pt denies the need for a work note.

## 2019-06-15 NOTE — ED PROVIDER NOTES
"ED Provider Note    CHIEF COMPLAINT  Chief Complaint   Patient presents with   • Anxiety   • Shortness of Breath   • Numbness        HPI    Primary care provider: [place] M[place]le[place] M.D.   History obtained from: Patient   History limited by: None     Mika Mcmahon is a 58 y.o. male who presents to the ED complaining of \"just a little bit\" of shortness of breath since 10:00 yesterday morning.  He has not noticed anything that makes his symptoms better or worse.  He also had some numbness on the left side of his head yesterday morning but that has since resolved.  He went to urgent care yesterday for his numbness, dizziness and reported chest pain and was sent to [facility] ED.  Patient states that he waited more than 4 hours and decided to go home because the wait was too long.  He states that after he got home he tried to go to sleep but was afraid to go to sleep because he was worried that he would not get enough oxygen if he fell asleep.  He currently denies any pain.  He reports that his mouth feels dry.  He also states that his abdomen feels bloated.  He states that there was a potluck at work 2 days ago and he had 4 slices of pizza along with 2 soda and his abdomen felt bloated afterwards.  He states that his abdomen often feels bloated after eating but he currently denies any pain.  He denies nausea/vomiting/diarrhea/constipation/dysuria.  He has not noticed any rash.  He states that he no longer has any numbness or dizziness.  He denies any known history of heart problems, CVA, blood clots.  He denies any recent travels or cough.  He has not had any fever.    REVIEW OF SYSTEMS  Please see HPI for pertinent positives/negatives.  All other systems reviewed and are negative.     PAST MEDICAL HISTORY  Past Medical History:   Diagnosis Date   • Psychiatric disorder     anxiety        SURGICAL HISTORY  No past surgical history on file.     SOCIAL HISTORY  Social History     Social History Main Topics " "  • Smoking status: Former Smoker   • Smokeless tobacco: Never Used   • Alcohol use No   • Drug use: No   • Sexual activity: Not Currently     Partners: Female        FAMILY HISTORY  Family History   Problem Relation Age of Onset   • Other Mother         Rash   • No Known Problems Father    • Cancer Brother         Stomach CA   • No Known Problems Maternal Grandmother    • No Known Problems Maternal Grandfather    • No Known Problems Paternal Grandmother    • No Known Problems Paternal Grandfather    • Heart Attack Neg Hx    • Stroke Neg Hx         CURRENT MEDICATIONS  Home Medications     Reviewed by Sarah Jesus R.N. (Registered Nurse) on 06/15/19 at 0309  Med List Status: Partial   Medication Last Dose Status   FLUoxetine HCl (PROZAC PO)  Active   fluticasone (FLONASE) 50 MCG/ACT nasal spray  Active   ibuprofen (MOTRIN) 600 MG Tab  Active   naproxen (NAPROSYN) 500 MG Tab  Active                 ALLERGIES  No Known Allergies     PHYSICAL EXAM  VITAL SIGNS: /74   Pulse 74   Temp 36.3 °C (97.3 °F) (Temporal)   Resp 18   Ht 1.702 m (5' 7\")   Wt 91.8 kg (202 lb 6.1 oz)   SpO2 95% Comment: r/a  BMI 31.70 kg/m²  @CAROLA[[id number]::@     Pulse ox interpretation: 95% I interpret this pulse ox as normal     Cardiac monitor interpretation: Sinus rhythm with heart rate in the 60s as interpreted by me.  The patient presented with dyspnea and cardiac monitor was ordered to monitor for dysrhythmia.    Constitutional: Well developed, well nourished, alert in no apparent distress, nontoxic appearance    HENT: No external signs of trauma, normocephalic, oropharynx moist and clear, nose normal    Eyes: PERRL, EOMI, vision and visual fields are grossly intact bilaterally, conjunctiva without erythema, no discharge, no icterus    Neck: Soft and supple, trachea midline, no stridor, no tenderness, no LAD, no JVD, good ROM    Cardiovascular: Regular rate and rhythm, no murmurs/rubs/gallops, strong distal pulses and good " perfusion    Thorax & Lungs: No respiratory distress, CTAB   Abdomen: Soft, nontender, nondistended, no guarding, no rebound, normal BS    Back: No CVAT    Extremities: No cyanosis, no edema, no gross deformity, good ROM, no tenderness, intact distal pulses with brisk cap refill    Skin: Warm, dry, no pallor/cyanosis, no rash noted    Lymphatic: No lymphadenopathy noted    Neuro: Alert and oriented to person, place, and time.  GCS 15.  CN II-XII grossly intact.  Normal speech.  Equal strength bilateral UE/LE.  Sensation intact to touch.  No cerebellar signs.  Normal gait.    Psychiatric: Cooperative, slightly anxious, normal judgement      DIAGNOSTIC STUDIES / PROCEDURES    EKG  12 Lead EKG obtained at 0337 and interpreted by me:   Rate: 58   Rhythm: Sinus bradycardia  Ectopy: None  Intervals: Normal   Axis: Normal   QRS: Normal   ST segments: Normal  T Waves: T wave inversion lead I and aVL    Clinical Impression: Sinus bradycardia with nonspecific T wave changes otherwise no acute ischemic changes or dysrhythmia  Compared to January 24, 2019 with similar appearance      LABS  All labs reviewed by me.     Results for orders placed or performed during the hospital encounter of 06/15/19   CBC WITH DIFFERENTIAL   Result Value Ref Range    WBC 8.4 4.8 - 10.8 K/uL    RBC 5.42 4.70 - 6.10 M/uL    Hemoglobin 15.2 14.0 - 18.0 g/dL    Hematocrit 44.0 42.0 - 52.0 %    MCV 81.2 (L) 81.4 - 97.8 fL    MCH 28.0 27.0 - 33.0 pg    MCHC 34.5 33.7 - 35.3 g/dL    RDW 40.2 35.9 - 50.0 fL    Platelet Count 274 164 - 446 K/uL    MPV 8.9 (L) 9.0 - 12.9 fL    Neutrophils-Polys 47.80 44.00 - 72.00 %    Lymphocytes 41.00 22.00 - 41.00 %    Monocytes 8.00 0.00 - 13.40 %    Eosinophils 2.50 0.00 - 6.90 %    Basophils 0.50 0.00 - 1.80 %    Immature Granulocytes 0.20 0.00 - 0.90 %    Nucleated RBC 0.00 /100 WBC    Neutrophils (Absolute) 4.02 1.82 - 7.42 K/uL    Lymphs (Absolute) 3.44 1.00 - 4.80 K/uL    Monos (Absolute) 0.67 0.00 - 0.85 K/uL     Eos (Absolute) 0.21 0.00 - 0.51 K/uL    Baso (Absolute) 0.04 0.00 - 0.12 K/uL    Immature Granulocytes (abs) 0.02 0.00 - 0.11 K/uL    NRBC (Absolute) 0.00 K/uL   COMP METABOLIC PANEL   Result Value Ref Range    Sodium 140 135 - 145 mmol/L    Potassium 3.3 (L) 3.6 - 5.5 mmol/L    Chloride 109 96 - 112 mmol/L    Co2 24 20 - 33 mmol/L    Anion Gap 7.0 0.0 - 11.9    Glucose 110 (H) 65 - 99 mg/dL    Bun 8 8 - 22 mg/dL    Creatinine 0.82 0.50 - 1.40 mg/dL    Calcium 8.1 (L) 8.4 - 10.2 mg/dL    AST(SGOT) 30 12 - 45 U/L    ALT(SGPT) 26 2 - 50 U/L    Alkaline Phosphatase 55 30 - 99 U/L    Total Bilirubin 0.8 0.1 - 1.5 mg/dL    Albumin 3.2 3.2 - 4.9 g/dL    Total Protein 6.3 6.0 - 8.2 g/dL    Globulin 3.1 1.9 - 3.5 g/dL    A-G Ratio 1.0 g/dL   TROPONIN   Result Value Ref Range    Troponin I <0.02 0.00 - 0.04 ng/mL   D-DIMER   Result Value Ref Range    D-Dimer Screen <0.40 0.00 - 0.50 ug/mL (FEU)   LIPASE   Result Value Ref Range    Lipase 42 7 - 58 U/L   ESTIMATED GFR   Result Value Ref Range    GFR If African American >60 >60 mL/min/1.73 m 2    GFR If Non African American >60 >60 mL/min/1.73 m 2   EKG (NOW)   Result Value Ref Range    Report       [facility] Emergency Dept.    Test Date:  2019-06-15  Pt Name:    RM RAMOS                Department: [facility]  MRN:        [id number]                      Room:       -ROOM 1  Gender:     Male                         Technician: IVELISSE  :        1960                   Requested By:ANGELICA YO  Order #:    [id number]                    Reading MD:    Measurements  Intervals                                Axis  Rate:       58                           P:          20  MI:         177                          QRS:        36  QRSD:       95                           T:          99  QT:         421  QTc:        414    Interpretive Statements  Sinus rhythm  Abnormal R-wave progression, early transition  Nonspecific T abnormalities, lateral leads  Baseline  "wander in lead(s) V4  Compared to ECG 01/24/2019 20:58:33  T-wave abnormality now present          RADIOLOGY  The radiologist's interpretation of all radiological studies have been reviewed by me.     DX-CHEST-2 VIEWS   Final Result         1. No active cardiopulmonary abnormalities are identified.             COURSE & MEDICAL DECISION MAKING  Nursing notes, VS, PMSFHx reviewed in chart.     Review of past medical records shows the patient was seen at urgent care yesterday for numbness to the left side of his head as well as chest pain.  EKG was performed showing nonspecific T wave changes but no change from January 2019.  Patient was sent to [facility] ED for further evaluation.  Patient went to [facility] ED but left prior to being seen by a physician.  Patient was last seen in this ED January 24, 2019 for chest pain, dizziness and bilateral leg pain and work-up was unremarkable..      Differential diagnoses considered include but are not limited to: AMI, PE, pleural effusion, anxiety/hyperventilation       Pt risk-stratified as low risk for MACE in the next 6 weeks by low HEART Score (0-3): 3    HISTORY  Highly suspicious +2  Moderately suspicious +1  Slightly suspicious 0    EKG  Significant ST depression +2  Nonspecific repolarization disturbance +1  Normal 0    AGE  ? 65 +2  45-65 +1  < 45 0    RISK FACTORS  Hypercholesterolemia, HTN, DM, Cigarette smoking, positive family history, obesity  ? 3 risk factors or history of atherosclerotic disease +2  1-2 risk factors +1  No risk factors known 0    TROPONIN  ? 3× normal limit +2  1-3× normal limit +1  ? normal limit 0      History and physical exam as above.  EKG without significant change compared to prior.  Chest x-ray without evidence of acute abnormality.  Labs are fairly unremarkable except for mild hypokalemia.  Findings discussed with the patient.  Patient states that he was able to fall asleep for 15 minutes in the ED and feels \"much more " "relaxed\" and states that he feels better.  He is noted to be in no acute distress and nontoxic in appearance.  Vital signs have been stable and unremarkable in the ED.  Low clinical suspicion at this time for more serious acute pathology given the history/exam/findings.  However, discussed with patient worrisome signs and symptoms and return to ED precautions and he was advised on outpatient follow-up for further evaluation.  Patient verbalized understanding and agreed with plan of care with no further questions or concerns.      The patient is referred to a primary physician for blood pressure management, diabetic screening, and for all other preventative health concerns.       FINAL IMPRESSION  1. Dyspnea, unspecified type Acute          DISPOSITION  Patient will be discharged home in stable condition.       FOLLOW UP  [place] Mccle[place]and M.D.  [address]  [address]  [place] [address]  [phone]    Call today      [facility], Emergency Dept  [address]  [place] [address]  [phone]    If symptoms worsen         OUTPATIENT MEDICATIONS  Discharge Medication List as of 6/15/2019  4:03 AM             Electronically signed by: Jose Luis Barreto, 6/15/2019 3:08 AM      Portions of this record were made with voice recognition software.  Despite my review, spelling/grammar/context errors may still remain.  Interpretation of this chart should be taken in this context.    "

## 2019-06-15 NOTE — ED TRIAGE NOTES
"Mika Mcmahon    Chief Complaint   Patient presents with   • Numbness     L side of head    • Sent from Urgent Care       Pt ambulatory to triage with above complaint. Pt states numbness starte when he woke up around 1000 today.  Pt had similar episode around 1600. Pt currently denies numbness to L side of head.  Denies NV, dizziness. Neuro intact. Aox4, GCS 15. VSS.    Pt returned to lobby, educated on triage process, and to inform staff of any changes or concerns.    Blood Pressure: 120/78, Pulse: 70, Respiration: 16, Temperature: 36.7 °C (98 °F), Height: 170.2 cm (5' 7\"), Weight: 92.5 kg (203 lb 14.8 oz), Pulse Oximetry: 97 %, O2 (LPM): 0    "

## 2019-06-15 NOTE — PROGRESS NOTES
Subjective:      Mika Mcmahon is a 58 y.o. male who presents with Numbness (Complains left side of head feels numb and left eye would open when trying to wake up )            Other   This is a new problem. The current episode started today. The problem has been waxing and waning. Associated symptoms include chest pain. Pertinent negatives include no abdominal pain, diaphoresis, fever, headaches, nausea, rash or vomiting. Nothing aggravates the symptoms. He has tried nothing for the symptoms.   Chest Pain    This is a recurrent problem. The current episode started today. The onset quality is sudden. The problem has been resolved. The pain is present in the substernal region. The pain is moderate. The pain does not radiate. Pertinent negatives include no abdominal pain, diaphoresis, dizziness, fever, headaches, lower extremity edema, nausea, palpitations, shortness of breath or vomiting. He has tried nothing for the symptoms.   Patient notes onset of left head numbness sensation, difficulty opening left eye, difficulty speaking earlier today; lasted for several minutes, resolved.  Later noted left head numbness sensation again, resolved again.  No trauma, no headache. Also noted earlier today substernal chest discomfort after drinking cold beverage; resolved.    Review of Systems   Constitutional: Negative for diaphoresis and fever.   HENT: Negative for ear pain, hearing loss and tinnitus.    Eyes: Negative for blurred vision and double vision.   Respiratory: Negative for shortness of breath.    Cardiovascular: Positive for chest pain. Negative for palpitations.   Gastrointestinal: Negative for abdominal pain, nausea and vomiting.   Skin: Negative for rash.   Neurological: Negative for dizziness, tingling, focal weakness and headaches.     PMH:  has a past medical history of Psychiatric disorder.  MEDS:   Current Outpatient Prescriptions:   •  FLUoxetine HCl (PROZAC PO), Take  by mouth., Disp: , Rfl:   •   "naproxen (NAPROSYN) 500 MG Tab, Take 1 Tab by mouth 2 times a day, with meals. (Patient not taking: Reported on 6/14/2019), Disp: 60 Tab, Rfl: 0  •  fluticasone (FLONASE) 50 MCG/ACT nasal spray, Spray 1 Spray in nose every day. (Patient not taking: Reported on 6/14/2019), Disp: 16 g, Rfl: 2  •  ibuprofen (MOTRIN) 600 MG Tab, Take 1 Tab by mouth every 6 hours as needed., Disp: 30 Tab, Rfl: 0  ALLERGIES: No Known Allergies  SURGHX: History reviewed. No pertinent surgical history.  SOCHX:  reports that he has quit smoking. He has never used smokeless tobacco. He reports that he does not drink alcohol or use drugs.  FH: family history includes Cancer in his brother; No Known Problems in his father, maternal grandfather, maternal grandmother, paternal grandfather, and paternal grandmother; Other in his mother.       Objective:     /72   Pulse 76   Temp 36.8 °C (98.3 °F) (Temporal)   Resp 16   Ht 1.702 m (5' 7\")   Wt 91.6 kg (202 lb)   SpO2 96%   BMI 31.64 kg/m²      Physical Exam   Constitutional: He is oriented to person, place, and time. Vital signs are normal. He appears well-developed and well-nourished. He is cooperative.  Non-toxic appearance. He does not have a sickly appearance.   HENT:   Head: Normocephalic and atraumatic.   Right Ear: Hearing and external ear normal.   Left Ear: Hearing and external ear normal.   Nose: No mucosal edema or rhinorrhea.   Mouth/Throat: Uvula is midline. No trismus in the jaw. No oropharyngeal exudate, posterior oropharyngeal edema or posterior oropharyngeal erythema.   No head tenderness.   Eyes: Pupils are equal, round, and reactive to light. Conjunctivae, EOM and lids are normal.   Neck: Trachea normal and phonation normal. Neck supple. No JVD present.   Cardiovascular: Normal rate, regular rhythm and normal heart sounds.    Pulmonary/Chest: Effort normal and breath sounds normal.   Lymphadenopathy:     He has no cervical adenopathy.   Neurological: He is alert and " oriented to person, place, and time. He has normal strength. He displays no tremor. No cranial nerve deficit or sensory deficit. He displays a negative Romberg sign. Coordination normal.   Skin: Skin is warm and dry. No rash noted.   Psychiatric: He has a normal mood and affect.     Notes prior evaluations for chest pains negative.     Focal left facial symptoms concerning for TIA event.  Advised patient of need for ED evaluation and management; he agreed.  Since symptoms have resolved appears stable enough to transport by private vehicle; son will drive.  [facility] ED transfer center provided telephone report.     Assessment/Plan:     1. Left facial numbness    2. Chest pain, unspecified type  Normal sinus rhythm, nonspecific T wave changes, no change from study January 2019- EKG - Clinic Performed

## 2019-06-15 NOTE — ED NOTES
"The pt has multiple c/o:  1.  L side of head numbness \"not since this morning\"  2.  ble pain, \" I have to stand too long at my job sometimes 9 hours a day \" . 3.  Anxiety 4.  Shortness of breathe \"not any more\" the pt was seen at urgent care yesterday and sent to [facility] where the pt did not wait to been seen and then he left [facility] and is now presenting here.   "

## 2019-06-15 NOTE — ED NOTES
Pt called 2nd time from [facility]. No response. Will discharge at this time.

## 2019-08-12 ENCOUNTER — OFFICE VISIT (OUTPATIENT)
Dept: MEDICAL GROUP | Facility: MEDICAL CENTER | Age: 59
End: 2019-08-12
Payer: COMMERCIAL

## 2019-08-12 VITALS
TEMPERATURE: 98.3 F | BODY MASS INDEX: 30.97 KG/M2 | OXYGEN SATURATION: 96 % | HEIGHT: 67 IN | RESPIRATION RATE: 12 BRPM | HEART RATE: 70 BPM | WEIGHT: 197.31 LBS | DIASTOLIC BLOOD PRESSURE: 70 MMHG | SYSTOLIC BLOOD PRESSURE: 110 MMHG

## 2019-08-12 DIAGNOSIS — R73.03 PREDIABETES: ICD-10-CM

## 2019-08-12 DIAGNOSIS — R42 POSTURAL DIZZINESS WITH PRESYNCOPE: ICD-10-CM

## 2019-08-12 DIAGNOSIS — Z00.00 ANNUAL PHYSICAL EXAM: ICD-10-CM

## 2019-08-12 DIAGNOSIS — E66.9 OBESITY (BMI 30-39.9): ICD-10-CM

## 2019-08-12 DIAGNOSIS — F41.8 DEPRESSION WITH ANXIETY: ICD-10-CM

## 2019-08-12 DIAGNOSIS — E78.2 MIXED DYSLIPIDEMIA: ICD-10-CM

## 2019-08-12 DIAGNOSIS — R79.89 LOW TESTOSTERONE LEVEL IN MALE: ICD-10-CM

## 2019-08-12 DIAGNOSIS — R55 POSTURAL DIZZINESS WITH PRESYNCOPE: ICD-10-CM

## 2019-08-12 PROBLEM — G56.22 ULNAR NEUROPATHY AT ELBOW OF LEFT UPPER EXTREMITY: Status: RESOLVED | Noted: 2018-02-27 | Resolved: 2019-08-12

## 2019-08-12 PROBLEM — G56.02 CARPAL TUNNEL SYNDROME OF LEFT WRIST: Status: RESOLVED | Noted: 2017-11-14 | Resolved: 2019-08-12

## 2019-08-12 PROCEDURE — 99214 OFFICE O/P EST MOD 30 MIN: CPT | Performed by: FAMILY MEDICINE

## 2019-08-12 NOTE — ASSESSMENT & PLAN NOTE
"This is a new problem, uncontrolled, patient states that over the last 3 to 6 months he has been experiencing episodes of dizziness and feeling \"like the back part of my head is empty \" (this was in [place]), and that this typically occurs when he is standing up.  However, after he sits down and takes several deep breaths this will typically go away after no more than 30 to 60 seconds.    Thus, we did discuss the possibility that he is having orthostatic hypotension, carotid artery stenosis, carotid sinus issue, or potentially ischemic cardiomyopathy.  Additionally, differential diagnosis should also include anxiety attacks.  Of note, patient is taking his alprazolam half tablet p.o. twice weekly to potentially 3 times weekly.  Thus, we may also need to discuss long-term anxiety control if evaluation, as below, is negative or normal.  Patient verbalized understanding and agreement.    ROS is NEGATIVE for dizziness, generalized weakness/fatigue, cold sweats,  vision/hearing changes, jaw pain/paresthesias, BUE pain/paresthesias/numbness/weakness, chest pain/pressure, palpitations, dyspnea, nausea, RUQ abdominal pain, oliguria/anuria, BLE edema.  "

## 2019-08-12 NOTE — PROGRESS NOTES
"Subjective:   Chief Complaint/History of Present Illness:  Mika Mcmahon is a 58 y.o. male established patient who presents today to discuss medical problems as listed below    Diagnoses of Postural dizziness with presyncope, Depression with anxiety, Obesity (BMI 30-39.9), Annual physical exam, Mixed dyslipidemia, Prediabetes, and Low testosterone level in male were pertinent to this visit.    Postural dizziness with presyncope  This is a new problem, uncontrolled, patient states that over the last 3 to 6 months he has been experiencing episodes of dizziness and feeling \"like the back part of my head is empty \" (this was in [place]), and that this typically occurs when he is standing up.  However, after he sits down and takes several deep breaths this will typically go away after no more than 30 to 60 seconds.    Thus, we did discuss the possibility that he is having orthostatic hypotension, carotid artery stenosis, carotid sinus issue, or potentially ischemic cardiomyopathy.  Additionally, differential diagnosis should also include anxiety attacks.  Of note, patient is taking his alprazolam half tablet p.o. twice weekly to potentially 3 times weekly.  Thus, we may also need to discuss long-term anxiety control if evaluation, as below, is negative or normal.  Patient verbalized understanding and agreement.    ROS is NEGATIVE for dizziness, generalized weakness/fatigue, cold sweats,  vision/hearing changes, jaw pain/paresthesias, BUE pain/paresthesias/numbness/weakness, chest pain/pressure, palpitations, dyspnea, nausea, RUQ abdominal pain, oliguria/anuria, BLE edema.    Depression with anxiety  Chronic, uncontrolled, please see notes from same date of service 8/12/2019 regarding postural dizziness with presyncope.    Obesity (BMI 30-39.9)  Chronic, uncontrolled, patient advised to pursue lifestyle changes, particularly cardiovascular exercise and increasing proportion of plant-based nutrition.      Patient " "Active Problem List    Diagnosis Date Noted   • Postural dizziness with presyncope 08/12/2019   • Low testosterone level in male 03/31/2018   • Prediabetes 12/27/2017   • Mixed dyslipidemia 12/15/2017   • Irritant contact dermatitis due to other agents 12/14/2017   • Erectile dysfunction due to arterial insufficiency 12/14/2017   • Depression with anxiety 11/14/2017   • Obesity (BMI 30-39.9) 11/14/2017   • Multiple acquired skin tags 11/14/2017       Additional History:   Allergies:    Patient has no known allergies.     Current Medications:     Current Outpatient Medications   Medication Sig Dispense Refill   • FLUoxetine HCl (PROZAC PO) Take  by mouth.     • naproxen (NAPROSYN) 500 MG Tab Take 1 Tab by mouth 2 times a day, with meals. (Patient not taking: Reported on 6/14/2019) 60 Tab 0   • fluticasone (FLONASE) 50 MCG/ACT nasal spray Spray 1 Spray in nose every day. (Patient not taking: Reported on 6/14/2019) 16 g 2   • ibuprofen (MOTRIN) 600 MG Tab Take 1 Tab by mouth every 6 hours as needed. 30 Tab 0     No current facility-administered medications for this visit.         Social History:     Social History     Tobacco Use   • Smoking status: Former Smoker   • Smokeless tobacco: Never Used   Substance Use Topics   • Alcohol use: No   • Drug use: No       ROS:     - NOTE: All other systems reviewed and are negative, except as in HPI.     Objective:   Physical Exam:    Vitals: /70 (BP Location: Left arm, Patient Position: Sitting, BP Cuff Size: Adult)   Pulse 70   Temp 36.8 °C (98.3 °F) (Temporal)   Resp 12   Ht 1.702 m (5' 7\")   Wt 89.5 kg (197 lb 5 oz)   SpO2 96%    BMI: Body mass index is 30.9 kg/m².   General/Constitutional: Vitals as above, Well nourished, well developed male in no acute distress   Head/Eyes: Head is grossly normal & atraumatic, bilateral conjunctivae clear and not injected, bilateral EOMI, bilateral PERRL   ENT: Bilateral external ears grossly normal in appearance, Hearing " grossly intact, External nares normal in appearance and without discharge/bleeding   Respiratory: No respiratory distress, bilateral lungs are clear to ausculation in all lung fields (anterior/lateral/posterior), no wheezing/rhonchi/rales   Cardiovascular: Regular rate and rhythm without murmur/gallops/rubs, distal pulses are intact and equal bilaterally (radial, posterior tibial), carotid artery bruits were not easily auscultated at present visit, no bilateral lower extremity edema   MSK: Gait grossly normal & not antalgic   Integumentary: No apparent rashes   Psych: Judgment grossly appropriate, no apparent depression/anxiety    Health Maintenance:     - Hep C ordered as below    Imaging/Labs:     - No new labs to review    Assessment and Plan:   1. Postural dizziness with presyncope  New problem, uncontrolled, evaluate carotid arteries for carotid artery stenosis, evaluate heart for possible valvular or arrhythmogenic issue.  Evaluate labs as below for possible metabolic disruptions.   - US-CAROTID DOPPLER BILAT; Future   - EC-ECHOCARDIOGRAM COMPLETE W/O CONT; Future   - Comp Metabolic Panel; Future   - CBC WITH DIFFERENTIAL; Future   - TSH; Future   - FREE THYROXINE; Future    - TRIIDOTHYRONINE; Future    2. Depression with anxiety  Chronic, uncontrolled, we may need to discuss long-term medication use versus increased frequency of as needed medication.  Patient states he has enough benzodiazepine for now.   - Comp Metabolic Panel; Future   - TSH; Future   - FREE THYROXINE; Future   - TRIIDOTHYRONINE; Future    3. Obesity (BMI 30-39.9)  Chronic, uncontrolled, patient advised to pursue lifestyle changes, particularly cardiovascular exercise and increasing proportion of plant-based nutrition.    4. Annual physical exam  5. Mixed dyslipidemia  6. Prediabetes  7. Low testosterone level in male  Labs ordered for annual medical exam   - HEMOGLOBIN A1C; Future   - Comp Metabolic Panel; Future   - Lipid Profile;  Future   - PROSTATE SPECIFIC AG SCREENING; Future   - MICROALBUMIN CREAT RATIO URINE; Future   - TESTOSTERONE F&T MALE ADULT; Future    RTC: In 1 month to review #1 and #2, also for annual medical exam.    PLEASE NOTE: This dictation was created using voice recognition software. I have made every reasonable attempt to correct obvious errors, but I expect that there are errors of grammar and possibly content that I did not discover before finalizing the note.

## 2019-08-12 NOTE — ASSESSMENT & PLAN NOTE
Chronic, uncontrolled, please see notes from same date of service 8/12/2019 regarding postural dizziness with presyncope.

## 2019-08-23 ENCOUNTER — HOSPITAL ENCOUNTER (OUTPATIENT)
Dept: LAB | Facility: MEDICAL CENTER | Age: 59
End: 2019-08-23
Attending: FAMILY MEDICINE
Payer: COMMERCIAL

## 2019-08-23 DIAGNOSIS — R79.89 LOW TESTOSTERONE LEVEL IN MALE: ICD-10-CM

## 2019-08-23 DIAGNOSIS — Z00.00 ANNUAL PHYSICAL EXAM: ICD-10-CM

## 2019-08-23 DIAGNOSIS — R42 POSTURAL DIZZINESS WITH PRESYNCOPE: ICD-10-CM

## 2019-08-23 DIAGNOSIS — R55 POSTURAL DIZZINESS WITH PRESYNCOPE: ICD-10-CM

## 2019-08-23 DIAGNOSIS — R73.03 PREDIABETES: ICD-10-CM

## 2019-08-23 DIAGNOSIS — E78.2 MIXED DYSLIPIDEMIA: ICD-10-CM

## 2019-08-23 DIAGNOSIS — F41.8 DEPRESSION WITH ANXIETY: ICD-10-CM

## 2019-08-23 LAB
ALBUMIN SERPL BCP-MCNC: 3.6 G/DL (ref 3.2–4.9)
ALBUMIN/GLOB SERPL: 1.2 G/DL
ALP SERPL-CCNC: 49 U/L (ref 30–99)
ALT SERPL-CCNC: 21 U/L (ref 2–50)
ANION GAP SERPL CALC-SCNC: 11 MMOL/L (ref 0–11.9)
AST SERPL-CCNC: 22 U/L (ref 12–45)
BASOPHILS # BLD AUTO: 0.8 % (ref 0–1.8)
BASOPHILS # BLD: 0.05 K/UL (ref 0–0.12)
BILIRUB SERPL-MCNC: 0.8 MG/DL (ref 0.1–1.5)
BUN SERPL-MCNC: 12 MG/DL (ref 8–22)
CALCIUM SERPL-MCNC: 8.9 MG/DL (ref 8.5–10.5)
CHLORIDE SERPL-SCNC: 109 MMOL/L (ref 96–112)
CHOLEST SERPL-MCNC: 222 MG/DL (ref 100–199)
CO2 SERPL-SCNC: 22 MMOL/L (ref 20–33)
CREAT SERPL-MCNC: 0.78 MG/DL (ref 0.5–1.4)
CREAT UR-MCNC: 70.2 MG/DL
EOSINOPHIL # BLD AUTO: 0.17 K/UL (ref 0–0.51)
EOSINOPHIL NFR BLD: 2.8 % (ref 0–6.9)
ERYTHROCYTE [DISTWIDTH] IN BLOOD BY AUTOMATED COUNT: 41.3 FL (ref 35.9–50)
FASTING STATUS PATIENT QL REPORTED: NORMAL
GLOBULIN SER CALC-MCNC: 2.9 G/DL (ref 1.9–3.5)
GLUCOSE SERPL-MCNC: 97 MG/DL (ref 65–99)
HCT VFR BLD AUTO: 45.3 % (ref 42–52)
HDLC SERPL-MCNC: 33 MG/DL
HGB BLD-MCNC: 15.1 G/DL (ref 14–18)
IMM GRANULOCYTES # BLD AUTO: 0.01 K/UL (ref 0–0.11)
IMM GRANULOCYTES NFR BLD AUTO: 0.2 % (ref 0–0.9)
LDLC SERPL CALC-MCNC: 134 MG/DL
LYMPHOCYTES # BLD AUTO: 2.09 K/UL (ref 1–4.8)
LYMPHOCYTES NFR BLD: 33.8 % (ref 22–41)
MCH RBC QN AUTO: 27.6 PG (ref 27–33)
MCHC RBC AUTO-ENTMCNC: 33.3 G/DL (ref 33.7–35.3)
MCV RBC AUTO: 82.7 FL (ref 81.4–97.8)
MICROALBUMIN UR-MCNC: 40.1 MG/DL
MICROALBUMIN/CREAT UR: 571 MG/G (ref 0–30)
MONOCYTES # BLD AUTO: 0.48 K/UL (ref 0–0.85)
MONOCYTES NFR BLD AUTO: 7.8 % (ref 0–13.4)
NEUTROPHILS # BLD AUTO: 3.38 K/UL (ref 1.82–7.42)
NEUTROPHILS NFR BLD: 54.6 % (ref 44–72)
NRBC # BLD AUTO: 0 K/UL
NRBC BLD-RTO: 0 /100 WBC
PLATELET # BLD AUTO: 297 K/UL (ref 164–446)
PMV BLD AUTO: 9.2 FL (ref 9–12.9)
POTASSIUM SERPL-SCNC: 3.6 MMOL/L (ref 3.6–5.5)
PROT SERPL-MCNC: 6.5 G/DL (ref 6–8.2)
PSA SERPL-MCNC: 0.65 NG/ML (ref 0–4)
RBC # BLD AUTO: 5.48 M/UL (ref 4.7–6.1)
SODIUM SERPL-SCNC: 142 MMOL/L (ref 135–145)
T3 SERPL-MCNC: 98.1 NG/DL (ref 60–181)
T4 FREE SERPL-MCNC: 0.75 NG/DL (ref 0.53–1.43)
TRIGL SERPL-MCNC: 273 MG/DL (ref 0–149)
TSH SERPL DL<=0.005 MIU/L-ACNC: 3.45 UIU/ML (ref 0.38–5.33)
WBC # BLD AUTO: 6.2 K/UL (ref 4.8–10.8)

## 2019-08-23 PROCEDURE — 84480 ASSAY TRIIODOTHYRONINE (T3): CPT

## 2019-08-23 PROCEDURE — 80061 LIPID PANEL: CPT

## 2019-08-23 PROCEDURE — 84270 ASSAY OF SEX HORMONE GLOBUL: CPT

## 2019-08-23 PROCEDURE — 80053 COMPREHEN METABOLIC PANEL: CPT

## 2019-08-23 PROCEDURE — 84153 ASSAY OF PSA TOTAL: CPT

## 2019-08-23 PROCEDURE — 36415 COLL VENOUS BLD VENIPUNCTURE: CPT

## 2019-08-23 PROCEDURE — 83036 HEMOGLOBIN GLYCOSYLATED A1C: CPT

## 2019-08-23 PROCEDURE — 82043 UR ALBUMIN QUANTITATIVE: CPT

## 2019-08-23 PROCEDURE — 84403 ASSAY OF TOTAL TESTOSTERONE: CPT

## 2019-08-23 PROCEDURE — 84439 ASSAY OF FREE THYROXINE: CPT

## 2019-08-23 PROCEDURE — 84443 ASSAY THYROID STIM HORMONE: CPT

## 2019-08-23 PROCEDURE — 85025 COMPLETE CBC W/AUTO DIFF WBC: CPT

## 2019-08-23 PROCEDURE — 82570 ASSAY OF URINE CREATININE: CPT

## 2019-08-24 LAB
EST. AVERAGE GLUCOSE BLD GHB EST-MCNC: 117 MG/DL
HBA1C MFR BLD: 5.7 % (ref 0–5.6)
SHBG SERPL-SCNC: 48 NMOL/L (ref 11–80)
TESTOST FREE MFR SERPL: 1.5 % (ref 1.6–2.9)
TESTOST FREE SERPL-MCNC: 66 PG/ML (ref 47–244)
TESTOST SERPL-MCNC: 442 NG/DL (ref 300–890)

## 2019-08-29 ENCOUNTER — HOSPITAL ENCOUNTER (OUTPATIENT)
Dept: RADIOLOGY | Facility: MEDICAL CENTER | Age: 59
End: 2019-08-29
Attending: FAMILY MEDICINE
Payer: COMMERCIAL

## 2019-08-29 ENCOUNTER — HOSPITAL ENCOUNTER (OUTPATIENT)
Dept: CARDIOLOGY | Facility: MEDICAL CENTER | Age: 59
End: 2019-08-29
Attending: FAMILY MEDICINE
Payer: COMMERCIAL

## 2019-08-29 DIAGNOSIS — R42 POSTURAL DIZZINESS WITH PRESYNCOPE: ICD-10-CM

## 2019-08-29 DIAGNOSIS — R55 POSTURAL DIZZINESS WITH PRESYNCOPE: ICD-10-CM

## 2019-08-29 LAB
LV EJECT FRACT  99904: 60
LV EJECT FRACT MOD 2C 99903: 65.63
LV EJECT FRACT MOD 4C 99902: 60.55
LV EJECT FRACT MOD BP 99901: 61.67

## 2019-08-29 PROCEDURE — 93306 TTE W/DOPPLER COMPLETE: CPT | Mod: 26 | Performed by: INTERNAL MEDICINE

## 2019-08-29 PROCEDURE — 93306 TTE W/DOPPLER COMPLETE: CPT

## 2019-08-29 PROCEDURE — 93880 EXTRACRANIAL BILAT STUDY: CPT

## 2019-09-19 ENCOUNTER — HOSPITAL ENCOUNTER (OUTPATIENT)
Dept: LAB | Facility: MEDICAL CENTER | Age: 59
End: 2019-09-19
Attending: FAMILY MEDICINE
Payer: COMMERCIAL

## 2019-09-19 LAB
ALBUMIN SERPL BCP-MCNC: 4.3 G/DL (ref 3.2–4.9)
ALBUMIN/GLOB SERPL: 1.2 G/DL
ALP SERPL-CCNC: 59 U/L (ref 30–99)
ALT SERPL-CCNC: 23 U/L (ref 2–50)
ANION GAP SERPL CALC-SCNC: 8 MMOL/L (ref 0–11.9)
AST SERPL-CCNC: 26 U/L (ref 12–45)
BASOPHILS # BLD AUTO: 1 % (ref 0–1.8)
BASOPHILS # BLD: 0.06 K/UL (ref 0–0.12)
BILIRUB SERPL-MCNC: 0.9 MG/DL (ref 0.1–1.5)
BUN SERPL-MCNC: 11 MG/DL (ref 8–22)
CALCIUM SERPL-MCNC: 9.3 MG/DL (ref 8.5–10.5)
CHLORIDE SERPL-SCNC: 106 MMOL/L (ref 96–112)
CHOLEST SERPL-MCNC: 223 MG/DL (ref 100–199)
CO2 SERPL-SCNC: 26 MMOL/L (ref 20–33)
CREAT SERPL-MCNC: 0.87 MG/DL (ref 0.5–1.4)
EOSINOPHIL # BLD AUTO: 0.1 K/UL (ref 0–0.51)
EOSINOPHIL NFR BLD: 1.6 % (ref 0–6.9)
ERYTHROCYTE [DISTWIDTH] IN BLOOD BY AUTOMATED COUNT: 41.5 FL (ref 35.9–50)
EST. AVERAGE GLUCOSE BLD GHB EST-MCNC: 117 MG/DL
FASTING STATUS PATIENT QL REPORTED: NORMAL
GLOBULIN SER CALC-MCNC: 3.5 G/DL (ref 1.9–3.5)
GLUCOSE SERPL-MCNC: 100 MG/DL (ref 65–99)
HBA1C MFR BLD: 5.7 % (ref 0–5.6)
HCT VFR BLD AUTO: 50 % (ref 42–52)
HDLC SERPL-MCNC: 41 MG/DL
HGB BLD-MCNC: 16.5 G/DL (ref 14–18)
IMM GRANULOCYTES # BLD AUTO: 0.01 K/UL (ref 0–0.11)
IMM GRANULOCYTES NFR BLD AUTO: 0.2 % (ref 0–0.9)
LDLC SERPL CALC-MCNC: 137 MG/DL
LYMPHOCYTES # BLD AUTO: 1.97 K/UL (ref 1–4.8)
LYMPHOCYTES NFR BLD: 32.1 % (ref 22–41)
MCH RBC QN AUTO: 27.5 PG (ref 27–33)
MCHC RBC AUTO-ENTMCNC: 33 G/DL (ref 33.7–35.3)
MCV RBC AUTO: 83.5 FL (ref 81.4–97.8)
MONOCYTES # BLD AUTO: 0.4 K/UL (ref 0–0.85)
MONOCYTES NFR BLD AUTO: 6.5 % (ref 0–13.4)
NEUTROPHILS # BLD AUTO: 3.6 K/UL (ref 1.82–7.42)
NEUTROPHILS NFR BLD: 58.6 % (ref 44–72)
NRBC # BLD AUTO: 0 K/UL
NRBC BLD-RTO: 0 /100 WBC
PLATELET # BLD AUTO: 338 K/UL (ref 164–446)
PMV BLD AUTO: 9.5 FL (ref 9–12.9)
POTASSIUM SERPL-SCNC: 3.9 MMOL/L (ref 3.6–5.5)
PROT SERPL-MCNC: 7.8 G/DL (ref 6–8.2)
PSA SERPL-MCNC: 0.77 NG/ML (ref 0–4)
RBC # BLD AUTO: 5.99 M/UL (ref 4.7–6.1)
SODIUM SERPL-SCNC: 140 MMOL/L (ref 135–145)
T3FREE SERPL-MCNC: 3.47 PG/ML (ref 2.4–4.2)
T4 FREE SERPL-MCNC: 0.76 NG/DL (ref 0.53–1.43)
TRIGL SERPL-MCNC: 224 MG/DL (ref 0–149)
TSH SERPL DL<=0.005 MIU/L-ACNC: 4.26 UIU/ML (ref 0.38–5.33)
WBC # BLD AUTO: 6.1 K/UL (ref 4.8–10.8)

## 2019-09-19 PROCEDURE — 84443 ASSAY THYROID STIM HORMONE: CPT

## 2019-09-19 PROCEDURE — 84481 FREE ASSAY (FT-3): CPT

## 2019-09-19 PROCEDURE — 83036 HEMOGLOBIN GLYCOSYLATED A1C: CPT

## 2019-09-19 PROCEDURE — 84153 ASSAY OF PSA TOTAL: CPT

## 2019-09-19 PROCEDURE — 36415 COLL VENOUS BLD VENIPUNCTURE: CPT

## 2019-09-19 PROCEDURE — 80061 LIPID PANEL: CPT

## 2019-09-19 PROCEDURE — 83655 ASSAY OF LEAD: CPT

## 2019-09-19 PROCEDURE — 85025 COMPLETE CBC W/AUTO DIFF WBC: CPT

## 2019-09-19 PROCEDURE — 80053 COMPREHEN METABOLIC PANEL: CPT

## 2019-09-19 PROCEDURE — 84439 ASSAY OF FREE THYROXINE: CPT

## 2019-09-21 LAB — LEAD BLDV-MCNC: <2 UG/DL (ref 0–4.9)

## 2020-11-14 ENCOUNTER — HOSPITAL ENCOUNTER (OUTPATIENT)
Dept: LAB | Facility: MEDICAL CENTER | Age: 60
End: 2020-11-14
Payer: COMMERCIAL

## 2020-11-14 LAB — COVID ORDER STATUS COVID19: NORMAL

## 2020-11-15 LAB
SARS-COV-2 RNA RESP QL NAA+PROBE: NOTDETECTED
SPECIMEN SOURCE: NORMAL

## 2021-03-15 DIAGNOSIS — Z23 NEED FOR VACCINATION: ICD-10-CM

## 2021-03-25 ENCOUNTER — HOSPITAL ENCOUNTER (EMERGENCY)
Facility: MEDICAL CENTER | Age: 61
End: 2021-03-25
Attending: EMERGENCY MEDICINE
Payer: MEDICAID

## 2021-03-25 VITALS
RESPIRATION RATE: 16 BRPM | OXYGEN SATURATION: 98 % | WEIGHT: 188 LBS | HEART RATE: 82 BPM | BODY MASS INDEX: 28.49 KG/M2 | HEIGHT: 68 IN | SYSTOLIC BLOOD PRESSURE: 132 MMHG | TEMPERATURE: 98.6 F | DIASTOLIC BLOOD PRESSURE: 70 MMHG

## 2021-03-25 DIAGNOSIS — M62.838 MUSCLE SPASM: ICD-10-CM

## 2021-03-25 DIAGNOSIS — T50.905A ADVERSE EFFECT OF DRUG, INITIAL ENCOUNTER: ICD-10-CM

## 2021-03-25 LAB
ANION GAP SERPL CALC-SCNC: 11 MMOL/L (ref 7–16)
BUN SERPL-MCNC: 11 MG/DL (ref 8–22)
CALCIUM SERPL-MCNC: 9.3 MG/DL (ref 8.4–10.2)
CHLORIDE SERPL-SCNC: 104 MMOL/L (ref 96–112)
CO2 SERPL-SCNC: 24 MMOL/L (ref 20–33)
CREAT SERPL-MCNC: 0.83 MG/DL (ref 0.5–1.4)
GLUCOSE SERPL-MCNC: 128 MG/DL (ref 65–99)
MAGNESIUM SERPL-MCNC: 2 MG/DL (ref 1.5–2.5)
POTASSIUM SERPL-SCNC: 3.6 MMOL/L (ref 3.6–5.5)
SODIUM SERPL-SCNC: 139 MMOL/L (ref 135–145)

## 2021-03-25 PROCEDURE — 83735 ASSAY OF MAGNESIUM: CPT

## 2021-03-25 PROCEDURE — 80048 BASIC METABOLIC PNL TOTAL CA: CPT

## 2021-03-25 PROCEDURE — A9270 NON-COVERED ITEM OR SERVICE: HCPCS | Performed by: EMERGENCY MEDICINE

## 2021-03-25 PROCEDURE — 700102 HCHG RX REV CODE 250 W/ 637 OVERRIDE(OP): Performed by: EMERGENCY MEDICINE

## 2021-03-25 PROCEDURE — 99283 EMERGENCY DEPT VISIT LOW MDM: CPT

## 2021-03-25 RX ORDER — LORAZEPAM 1 MG/1
1 TABLET ORAL ONCE
Status: COMPLETED | OUTPATIENT
Start: 2021-03-25 | End: 2021-03-25

## 2021-03-25 RX ADMIN — LORAZEPAM 1 MG: 1 TABLET ORAL at 05:04

## 2021-03-25 ASSESSMENT — FIBROSIS 4 INDEX
FIB4 SCORE: 0.95
FIB4 SCORE: 0.95

## 2021-03-25 NOTE — ED PROVIDER NOTES
ED Provider Note    CHIEF COMPLAINT  Chief Complaint   Patient presents with   • Muscle Spasms     Pt reports muscle spasms starting at 0100 this AM hours after ingesting marijuana edible.      HPI  Patient is a 60-year-old male who presents emergency room for evaluation of right-sided leg muscle spasms.  Patient states that he has had these occasionally in the past however he had some sleep disturbances on Monday and anxiety and took a marijuana edible at that time.  He feels like the muscle spasms got significantly worse and his anxiety was amplified.  He came in tonight for worsening spasms that awoke him from sleep.  He denies any chest pain, shortness of breath, headaches, vision changes, nausea or vomiting.  He endorses a dry mouth.  No other acute complaints and he denies any recent traumatic injuries.    PPE Note: I personally donned full PPE for all patient encounters during this visit, including being clean-shaven with an N95 respirator mask, gloves, and goggles.     REVIEW OF SYSTEMS  Constitutional: No fevers, chills, or recent illness.  Skin: No rashes or diaphoresis.  Respiratory: No SOB. No coughing   Cardiac: No CP, palpitations,  GI: No Abdominal Pain; N/V  MSK: as above. No calf pain or swelling.  Endocrine: No polyuria or polydipsia. No heat or cold intolerance.  Heme: No easy bruising. No history of bleeding disorders or anemia.    PAST MEDICAL HISTORY   has a past medical history of Psychiatric disorder.    SOCIAL HISTORY  Social History     Tobacco Use   • Smoking status: Former Smoker   • Smokeless tobacco: Never Used   Substance and Sexual Activity   • Alcohol use: No   • Drug use: No   • Sexual activity: Not Currently     Partners: Female       SURGICAL HISTORY  patient denies any surgical history    CURRENT MEDICATIONS  Home Medications    **Home medications have not yet been reviewed for this encounter**         ALLERGIES  No Known Allergies    PHYSICAL EXAM  VITAL SIGNS: /97    "Pulse 87   Temp 37 °C (98.6 °F) (Temporal)   Resp 19   Ht 1.727 m (5' 8\")   Wt 85.3 kg (188 lb)   SpO2 97%   BMI 28.59 kg/m²    Pulse ox interpretation: I interpret this pulse ox as normal.  Genl: M sitting in chair comfortably, speaking clearly, appears in no acute distress   ENT: Mucous membranes slightly dry, posterior pharynx clear, uvula midline, nares patent bilaterally  Pulmonary: Lungs are clear to auscultation bilaterally  CV:  RRR, no murmur appreciated, pulses 2+ in both upper and lower extremities  Abdomen: soft, NT/ND; no rebound/guarding  Musculoskeletal: Pain free ROM of the neck. Moving upper and lower extremities and spontaneous in coordinated fashion.  No appreciable spasms noted, no myoclonus, no rigidity  Neuro: A&Ox4 (person, place, time, situation), speech fluent, gait steady, no focal deficits appreciated  Psych: Patient has an appropriate affect and behavior  Skin: No rash or lesions.  No pallor or jaundice.  No cyanosis.  Warm and dry.     COURSE & MEDICAL DECISION MAKING  No orders to display     Labs Reviewed   BASIC METABOLIC PANEL - Abnormal; Notable for the following components:       Result Value    Glucose 128 (*)     All other components within normal limits   MAGNESIUM   ESTIMATED GFR     Pertinent Labs & Imaging studies reviewed. (See chart for details)    DDX:  Dehydration, electrolyte derangement, side effect of marijuana ingestion    MDM    Initial evaluation at 0440:  Patient seen and evaluated for symptoms as described above.  Patient has reported muscular spasms that awoke him from sleep and on clinical evaluation he has no tremulousness, no evidence of rigidity and no focal neurological deficits.  He does report some dry mouth and this is observed and treated with p.o. fluids.  He has no other constitutional complaints and vital signs are reassuring.  Following the ingestion of the marijuana gummy it appears that his perceived symptoms were made substantially worse and " he does appear slightly anxious.  Small dose of Ativan is given in addition to the p.o. fluids and BMP to assess for any gross electrolyte disturbances.    BMP shows no acute electrolyte abnormalities.  He is observed and following results is counseled regarding the findings and the side effects of some marijuana edibles.  He will follow-up with his routine outpatient physician is given strict return precautions should he develop fevers, chills, unilateral leg swelling or other acute maladies.    FINAL IMPRESSION  Visit Diagnoses     ICD-10-CM   1. Muscle spasm  M62.838   2. Adverse effect of drug, initial encounter  T50.905A       Electronically signed by: Darryl Brambila M.D., 3/25/2021 4:39 AM

## 2021-03-25 NOTE — ED TRIAGE NOTES
Assumed patient care. Pt assesement done. Pt reports waking up with muscle spasms at 0100 this AM. Pt states took marijuana edible gummy at 2200 last pm for chronic right leg pain. Plan of care reviewed with patient.

## 2021-03-25 NOTE — ED NOTES
"Assumed patient care. Pt assesement done. Pt reports taking a marijuana edible gummy last pm at 2200 for pain in right leg. Pt reports waking up this AM at 0100 with feeling of \" body spasms\". Pt reports pain in leg has been intermittent x1.5  years. Pt reports only other hx is anxiety and takes anti anxiety meds PRN, last took x3 days ago.   Plan of care reviewed with patient.   "

## 2021-03-25 NOTE — ED NOTES
Pt stats taking a marijuana gummy, pt also stats that after taking it that instead helping him with his leg pain that it is making him have muscle spasms

## 2021-09-19 ENCOUNTER — HOSPITAL ENCOUNTER (EMERGENCY)
Facility: MEDICAL CENTER | Age: 61
End: 2021-09-19
Attending: EMERGENCY MEDICINE
Payer: MEDICAID

## 2021-09-19 VITALS
RESPIRATION RATE: 20 BRPM | SYSTOLIC BLOOD PRESSURE: 140 MMHG | BODY MASS INDEX: 30 KG/M2 | TEMPERATURE: 97 F | HEIGHT: 68 IN | WEIGHT: 197.97 LBS | DIASTOLIC BLOOD PRESSURE: 78 MMHG | OXYGEN SATURATION: 98 % | HEART RATE: 68 BPM

## 2021-09-19 DIAGNOSIS — F41.9 ANXIETY: ICD-10-CM

## 2021-09-19 DIAGNOSIS — M62.838 NECK MUSCLE SPASM: ICD-10-CM

## 2021-09-19 DIAGNOSIS — R68.2 DRY MOUTH, UNSPECIFIED: ICD-10-CM

## 2021-09-19 LAB — GLUCOSE BLD-MCNC: 94 MG/DL (ref 65–99)

## 2021-09-19 PROCEDURE — 82962 GLUCOSE BLOOD TEST: CPT

## 2021-09-19 PROCEDURE — 99282 EMERGENCY DEPT VISIT SF MDM: CPT

## 2021-09-19 NOTE — ED PROVIDER NOTES
"ED Provider Note    CHIEF COMPLAINT  Chief Complaint   Patient presents with   • Shoulder Pain     Right \"over the last year\"   • Anxiety     worsening over the last couple days   • Insomnia     last night due to stress       HPI  Mika Mcmahon is a 60 y.o. male who presents complaining of bilateral shoulder/neck pain, intermittent leg pain, and dry mouth.  He also feels that he gets a bloated stomach after dinner every night.  He states he has had anxiety for 27 years and was on an antianxiety medication prescribed by his PCP who he last saw in 2019.  He has 4 refills but is unclear if he can fill these are not at [facility] again.  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  He states he is only able to sleep a few hours following his shift.  He admits to snoring.    Patient denies shortness of breath, chest pain, fevers, chills, statin therapy.  He takes ibuprofen with relief of his bilateral neck/shoulder and leg pain.    He reports being prediabetic and states that checking his blood sugar would relieve some of his anxiety.      ALLERGIES  No Known Allergies    CURRENT MEDICATIONS  Home Medications     Reviewed by Sridhar Hernadez R.N. (Registered Nurse) on 09/19/21 at 0621  Med List Status: Not Addressed   Medication Last Dose Status   FLUoxetine HCl (PROZAC PO)  Active   fluticasone (FLONASE) 50 MCG/ACT nasal spray  Active   ibuprofen (MOTRIN) 600 MG Tab  Active   naproxen (NAPROSYN) 500 MG Tab  Active                PAST MEDICAL HISTORY   has a past medical history of Psychiatric disorder.    SURGICAL HISTORY  patient denies any surgical history    SOCIAL HISTORY  Social History     Tobacco Use   • Smoking status: Former Smoker   • Smokeless tobacco: Never Used   Substance and Sexual Activity   • Alcohol use: No   • Drug use: No   • Sexual activity: Not Currently     Partners: Female       Family " "Hx:  Hypertension      REVIEW OF SYSTEMS  See HPI for further details.  All other systems are negative except as above in HPI.    PHYSICAL EXAM  VITAL SIGNS: /87   Pulse 65   Temp 36.1 °C (97 °F) (Oral)   Resp 16   Ht 1.727 m (5' 8\")   Wt 89.8 kg (197 lb 15.6 oz)   SpO2 96%   BMI 30.10 kg/m²     General:  WDWN  male, nontoxic appearing in NAD; A+Ox3; V/S as above  Skin: warm and dry; good color; no rash  HEENT: NCAT; EOMs intact; PERRL; no scleral icterus   Neck: FROM; no meningismus, no LAD; bilateral paraspinal/trapezius muscle tension/spasm  Cardiovascular: Regular heart rate and rhythm.  No murmurs, rubs, or gallops; pulses 2+ bilaterally radially  Lungs: Clear to auscultation with good air movement bilaterally.  No wheezes, rhonchi, or rales.   Abdomen: BS present; soft; NTND; no rebound, guarding, or rigidity.  No organomegaly or pulsatile mass   Extremities: WHEELER x 4; no e/o trauma; no pedal edema; neg Laura's  Neurologic: CNs III-XII grossly intact; speech clear; distal sensation intact; strength 5/5 UE/LEs  Psychiatric: Appropriate affect, normal mood    LABS  Results for orders placed or performed during the hospital encounter of 09/19/21   POCT glucose device results   Result Value Ref Range    Glucose - Accu-Ck 94 65 - 99 mg/dL         MEDICAL RECORD  I have reviewed patient's medical record and pertinent results are listed below.      COURSE & MEDICAL DECISION MAKING  I have reviewed any medical record information, laboratory studies and radiographic results as noted.    Mika Mcmahon is a 60 y.o. male who presents complaining of multiple issues, including intermittent leg pain, bloated stomach, and bilateral neck tightness/pain for some time.  He reports a history of anxiety.  His chief complaint and prompted to come to the ER today, however, is his dry mouth.  He states this is keeping him up at night.  He is also concerned that he may have diabetes.    Appropriate PPE was " worn at all times while interacting with the patient.    I do not suspect meningitis.  I do not suspect ACS.  Patient is not on a statin.  I do not feel he is at risk for rhabdomyolysis and his leg pain is intermittent.  When asked what his chief concern was and he responded with diabetes, I asked if checking her blood sugar would give him some peace of mind and he said yes.  This was 94.  He will follow up with a PCP.  I have contacted the ER  who will arrange this for him.  Return precautions were discussed.    FINAL IMPRESSION  1. Dry mouth, unspecified     2. Anxiety     3. Neck muscle spasm         Electronically signed by: Ana Vigil M.D., 9/19/2021 6:39 AM

## 2021-09-19 NOTE — DISCHARGE INSTRUCTIONS
Return to the ER for worsening symptoms    Follow-up with a primary care doctor.  We have left a message for the community health worker who will arrange this for you and call you to let you know about your appointment.    Once you have a primary care doctor, please request a sleep study to evaluate for sleep apnea as this may be affecting your sleep and causing insomnia.

## 2021-09-20 ENCOUNTER — PATIENT OUTREACH (OUTPATIENT)
Dept: HEALTH INFORMATION MANAGEMENT | Facility: OTHER | Age: 61
End: 2021-09-20

## 2021-09-20 NOTE — PROGRESS NOTES
9/20/2021  CHW received incoming call from [facility]. CHW called patient to follow up after ED visit. Patient needs PCP follow up appointment. Patient was scheduled at the [facility] on 10.26.2021 at 8am with Camilla DESAI. CHW informed patient of date, time, and location of appointment via text message. Patient will have transportation to appointment. CHW will no longer follow as patient has no other needs.

## 2023-01-13 NOTE — ASSESSMENT & PLAN NOTE
Chronic, uncontrolled, somewhat improving.     ROS is NEGATIVE for: cold or heat intolerance, anxiety/depression, chest pain/pressure, palpitations, hair thickening/coarsening/falling out/thinning, skin changes, diarrhea/constipation, unexpected weight change.    ROS is NEGATIVE for blurred vision, polydipsia, polyuria, diaphoresis, palpitations, fatigue, irritability, flank pain, BLE paresthesias.   Tamika PGY4, Millicent PGY3